# Patient Record
Sex: FEMALE | Race: WHITE | NOT HISPANIC OR LATINO | Employment: OTHER | URBAN - METROPOLITAN AREA
[De-identification: names, ages, dates, MRNs, and addresses within clinical notes are randomized per-mention and may not be internally consistent; named-entity substitution may affect disease eponyms.]

---

## 2017-01-12 ENCOUNTER — HOSPITAL ENCOUNTER (OUTPATIENT)
Dept: MAMMOGRAPHY | Facility: HOSPITAL | Age: 62
Discharge: HOME | End: 2017-01-12
Attending: FAMILY MEDICINE

## 2017-01-12 DIAGNOSIS — Z12.31 VISIT FOR SCREENING MAMMOGRAM: ICD-10-CM

## 2017-08-14 ENCOUNTER — RECORDS - HEALTHEAST (OUTPATIENT)
Dept: ADMINISTRATIVE | Facility: OTHER | Age: 62
End: 2017-08-14

## 2017-08-23 ENCOUNTER — HOSPITAL ENCOUNTER (OUTPATIENT)
Dept: RESPIRATORY THERAPY | Facility: CLINIC | Age: 62
Discharge: HOME | End: 2017-08-23

## 2017-08-23 DIAGNOSIS — J44.9 CHRONIC OBSTRUCTIVE LUNG DISEASE (H): ICD-10-CM

## 2017-11-10 ENCOUNTER — RECORDS - HEALTHEAST (OUTPATIENT)
Dept: LAB | Facility: CLINIC | Age: 62
End: 2017-11-10

## 2017-11-10 LAB
CHOLEST SERPL-MCNC: 107 MG/DL
FASTING STATUS PATIENT QL REPORTED: NO
HDLC SERPL-MCNC: 36 MG/DL
LDLC SERPL CALC-MCNC: 17 MG/DL
TRIGL SERPL-MCNC: 268 MG/DL

## 2018-02-01 ENCOUNTER — HOSPITAL ENCOUNTER (OUTPATIENT)
Dept: MAMMOGRAPHY | Facility: HOSPITAL | Age: 63
Discharge: HOME | End: 2018-02-01
Attending: FAMILY MEDICINE

## 2018-02-01 DIAGNOSIS — Z12.31 VISIT FOR SCREENING MAMMOGRAM: ICD-10-CM

## 2018-02-05 ENCOUNTER — RECORDS - HEALTHEAST (OUTPATIENT)
Dept: ADMINISTRATIVE | Facility: OTHER | Age: 63
End: 2018-02-05

## 2018-02-08 ENCOUNTER — HOSPITAL ENCOUNTER (OUTPATIENT)
Dept: MAMMOGRAPHY | Facility: HOSPITAL | Age: 63
Discharge: HOME | End: 2018-02-08
Attending: FAMILY MEDICINE

## 2018-02-08 DIAGNOSIS — N64.89 BREAST ASYMMETRY: ICD-10-CM

## 2018-08-20 ENCOUNTER — RECORDS - HEALTHEAST (OUTPATIENT)
Dept: LAB | Facility: CLINIC | Age: 63
End: 2018-08-20

## 2018-08-21 LAB
ALBUMIN SERPL-MCNC: 3.7 G/DL (ref 3.5–5)
ALP SERPL-CCNC: 75 U/L (ref 45–120)
ALT SERPL W P-5'-P-CCNC: 16 U/L (ref 0–45)
ANION GAP SERPL CALCULATED.3IONS-SCNC: 7 MMOL/L (ref 5–18)
AST SERPL W P-5'-P-CCNC: 17 U/L (ref 0–40)
BILIRUB SERPL-MCNC: 1 MG/DL (ref 0–1)
BUN SERPL-MCNC: 12 MG/DL (ref 8–22)
CALCIUM SERPL-MCNC: 9.8 MG/DL (ref 8.5–10.5)
CHLORIDE BLD-SCNC: 106 MMOL/L (ref 98–107)
CO2 SERPL-SCNC: 29 MMOL/L (ref 22–31)
CREAT SERPL-MCNC: 0.67 MG/DL (ref 0.6–1.1)
GFR SERPL CREATININE-BSD FRML MDRD: >60 ML/MIN/1.73M2
GLUCOSE BLD-MCNC: 85 MG/DL (ref 70–125)
POTASSIUM BLD-SCNC: 4 MMOL/L (ref 3.5–5)
PROT SERPL-MCNC: 6.9 G/DL (ref 6–8)
SODIUM SERPL-SCNC: 142 MMOL/L (ref 136–145)

## 2018-08-22 LAB
BKR LAB AP ABNORMAL BLEEDING: NO
BKR LAB AP BIRTH CONTROL/HORMONES: NORMAL
BKR LAB AP CERVICAL APPEARANCE: NORMAL
BKR LAB AP GYN ADEQUACY: NORMAL
BKR LAB AP GYN INTERPRETATION: NORMAL
BKR LAB AP HPV REFLEX: NORMAL
BKR LAB AP LMP: 2001
BKR LAB AP PATIENT STATUS: NO
BKR LAB AP PREVIOUS ABNORMAL: NORMAL
BKR LAB AP PREVIOUS NORMAL: 2012
HBA1C MFR BLD: 6.6 % (ref 4.2–6.1)
HIGH RISK?: NO
PATH REPORT.COMMENTS IMP SPEC: NORMAL
RESULT FLAG (HE HISTORICAL CONVERSION): NORMAL

## 2019-03-21 ENCOUNTER — HOSPITAL ENCOUNTER (OUTPATIENT)
Dept: MAMMOGRAPHY | Facility: CLINIC | Age: 64
Discharge: HOME | End: 2019-03-21
Attending: PHYSICIAN ASSISTANT

## 2019-03-21 ENCOUNTER — COMMUNICATION - HEALTHEAST (OUTPATIENT)
Dept: TELEHEALTH | Facility: CLINIC | Age: 64
End: 2019-03-21

## 2019-03-21 DIAGNOSIS — Z12.31 VISIT FOR SCREENING MAMMOGRAM: ICD-10-CM

## 2019-09-23 ENCOUNTER — RECORDS - HEALTHEAST (OUTPATIENT)
Dept: LAB | Facility: CLINIC | Age: 64
End: 2019-09-23

## 2019-09-23 LAB
ALBUMIN SERPL-MCNC: 3.8 G/DL (ref 3.5–5)
ALP SERPL-CCNC: 78 U/L (ref 45–120)
ALT SERPL W P-5'-P-CCNC: 19 U/L (ref 0–45)
ANION GAP SERPL CALCULATED.3IONS-SCNC: 13 MMOL/L (ref 5–18)
AST SERPL W P-5'-P-CCNC: 20 U/L (ref 0–40)
BILIRUB SERPL-MCNC: 1.1 MG/DL (ref 0–1)
BUN SERPL-MCNC: 11 MG/DL (ref 8–22)
CALCIUM SERPL-MCNC: 9.6 MG/DL (ref 8.5–10.5)
CHLORIDE BLD-SCNC: 103 MMOL/L (ref 98–107)
CHOLEST SERPL-MCNC: 121 MG/DL
CO2 SERPL-SCNC: 23 MMOL/L (ref 22–31)
CREAT SERPL-MCNC: 0.76 MG/DL (ref 0.6–1.1)
FASTING STATUS PATIENT QL REPORTED: NO
GFR SERPL CREATININE-BSD FRML MDRD: >60 ML/MIN/1.73M2
GLUCOSE BLD-MCNC: 158 MG/DL (ref 70–125)
HDLC SERPL-MCNC: 42 MG/DL
LDLC SERPL CALC-MCNC: 45 MG/DL
POTASSIUM BLD-SCNC: 3.8 MMOL/L (ref 3.5–5)
PROT SERPL-MCNC: 7.1 G/DL (ref 6–8)
SODIUM SERPL-SCNC: 139 MMOL/L (ref 136–145)
TRIGL SERPL-MCNC: 171 MG/DL

## 2019-09-24 LAB
25(OH)D3 SERPL-MCNC: 41 NG/ML (ref 30–80)
CANCER AG27-29 SERPL-ACNC: 8 U/ML (ref 0–39)

## 2020-09-28 ENCOUNTER — RECORDS - HEALTHEAST (OUTPATIENT)
Dept: LAB | Facility: CLINIC | Age: 65
End: 2020-09-28

## 2020-09-28 LAB
ALBUMIN SERPL-MCNC: 3.9 G/DL (ref 3.5–5)
ALP SERPL-CCNC: 89 U/L (ref 45–120)
ALT SERPL W P-5'-P-CCNC: 17 U/L (ref 0–45)
ANION GAP SERPL CALCULATED.3IONS-SCNC: 12 MMOL/L (ref 5–18)
AST SERPL W P-5'-P-CCNC: 20 U/L (ref 0–40)
BILIRUB SERPL-MCNC: 1 MG/DL (ref 0–1)
BUN SERPL-MCNC: 8 MG/DL (ref 8–22)
CALCIUM SERPL-MCNC: 9.7 MG/DL (ref 8.5–10.5)
CHLORIDE BLD-SCNC: 102 MMOL/L (ref 98–107)
CHOLEST SERPL-MCNC: 120 MG/DL
CO2 SERPL-SCNC: 27 MMOL/L (ref 22–31)
CREAT SERPL-MCNC: 0.68 MG/DL (ref 0.6–1.1)
CREAT UR-MCNC: 7.3 MG/DL
FASTING STATUS PATIENT QL REPORTED: ABNORMAL
GFR SERPL CREATININE-BSD FRML MDRD: >60 ML/MIN/1.73M2
GLUCOSE BLD-MCNC: 135 MG/DL (ref 70–125)
HDLC SERPL-MCNC: 41 MG/DL
LDLC SERPL CALC-MCNC: 45 MG/DL
MICROALBUMIN UR-MCNC: 0.67 MG/DL (ref 0–1.99)
MICROALBUMIN/CREAT UR: 91.8 MG/G
POTASSIUM BLD-SCNC: 3.6 MMOL/L (ref 3.5–5)
PROT SERPL-MCNC: 7.1 G/DL (ref 6–8)
SODIUM SERPL-SCNC: 141 MMOL/L (ref 136–145)
TRIGL SERPL-MCNC: 172 MG/DL

## 2020-10-28 ENCOUNTER — HOSPITAL ENCOUNTER (OUTPATIENT)
Dept: MAMMOGRAPHY | Facility: CLINIC | Age: 65
Discharge: HOME | End: 2020-10-28
Attending: PHYSICIAN ASSISTANT

## 2020-10-28 DIAGNOSIS — Z12.31 VISIT FOR SCREENING MAMMOGRAM: ICD-10-CM

## 2021-05-25 ENCOUNTER — RECORDS - HEALTHEAST (OUTPATIENT)
Dept: ADMINISTRATIVE | Facility: CLINIC | Age: 66
End: 2021-05-25

## 2021-05-26 ENCOUNTER — RECORDS - HEALTHEAST (OUTPATIENT)
Dept: ADMINISTRATIVE | Facility: CLINIC | Age: 66
End: 2021-05-26

## 2021-05-27 ENCOUNTER — RECORDS - HEALTHEAST (OUTPATIENT)
Dept: ADMINISTRATIVE | Facility: CLINIC | Age: 66
End: 2021-05-27

## 2021-05-28 ENCOUNTER — RECORDS - HEALTHEAST (OUTPATIENT)
Dept: ADMINISTRATIVE | Facility: CLINIC | Age: 66
End: 2021-05-28

## 2021-05-30 ENCOUNTER — RECORDS - HEALTHEAST (OUTPATIENT)
Dept: ADMINISTRATIVE | Facility: CLINIC | Age: 66
End: 2021-05-30

## 2021-05-31 ENCOUNTER — RECORDS - HEALTHEAST (OUTPATIENT)
Dept: ADMINISTRATIVE | Facility: CLINIC | Age: 66
End: 2021-05-31

## 2021-06-05 ENCOUNTER — RECORDS - HEALTHEAST (OUTPATIENT)
Dept: MAMMOGRAPHY | Facility: HOSPITAL | Age: 66
End: 2021-06-05

## 2021-06-05 DIAGNOSIS — N63.0 LUMP OR MASS IN BREAST: ICD-10-CM

## 2021-06-12 NOTE — PROGRESS NOTES
RESPIRATORY CARE NOTE     Patient Name: Janna Quispe  Today's Date: 8/23/2017     Complete PFT done. Pt performed tests with good effort. Test results meet ATS criteria. Albuterol neb given. Results scanned into epic. Pt left in no distress.          Problem List  Patient Active Problem List   Diagnosis     Osteopenia                           Emiliana Benton LRT

## 2021-07-13 ENCOUNTER — RECORDS - HEALTHEAST (OUTPATIENT)
Dept: ADMINISTRATIVE | Facility: CLINIC | Age: 66
End: 2021-07-13

## 2021-07-21 ENCOUNTER — RECORDS - HEALTHEAST (OUTPATIENT)
Dept: ADMINISTRATIVE | Facility: CLINIC | Age: 66
End: 2021-07-21

## 2021-09-12 ENCOUNTER — HOSPITAL ENCOUNTER (INPATIENT)
Facility: HOSPITAL | Age: 66
LOS: 2 days | Discharge: HOME | DRG: 287 | End: 2021-09-14
Attending: INTERNAL MEDICINE | Admitting: INTERNAL MEDICINE
Payer: COMMERCIAL

## 2021-09-12 ENCOUNTER — APPOINTMENT (OUTPATIENT)
Dept: CARDIOLOGY | Facility: HOSPITAL | Age: 66
DRG: 287 | End: 2021-09-12
Attending: INTERNAL MEDICINE
Payer: COMMERCIAL

## 2021-09-12 ENCOUNTER — APPOINTMENT (OUTPATIENT)
Dept: RADIOLOGY | Facility: HOSPITAL | Age: 66
DRG: 287 | End: 2021-09-12
Attending: EMERGENCY MEDICINE
Payer: COMMERCIAL

## 2021-09-12 DIAGNOSIS — I48.0 PAROXYSMAL ATRIAL FIBRILLATION (H): ICD-10-CM

## 2021-09-12 DIAGNOSIS — R07.9 CHEST PAIN, UNSPECIFIED TYPE: ICD-10-CM

## 2021-09-12 DIAGNOSIS — J44.1 COPD EXACERBATION (H): ICD-10-CM

## 2021-09-12 DIAGNOSIS — I50.43 ACUTE ON CHRONIC COMBINED SYSTOLIC AND DIASTOLIC HEART FAILURE (H): ICD-10-CM

## 2021-09-12 DIAGNOSIS — R00.2 PALPITATIONS: ICD-10-CM

## 2021-09-12 DIAGNOSIS — Z72.0 TOBACCO USER: Primary | ICD-10-CM

## 2021-09-12 DIAGNOSIS — I47.20 VENTRICULAR TACHYCARDIA (H): ICD-10-CM

## 2021-09-12 DIAGNOSIS — R06.02 SOB (SHORTNESS OF BREATH): ICD-10-CM

## 2021-09-12 PROBLEM — I44.7 LEFT BUNDLE BRANCH BLOCK: Status: ACTIVE | Noted: 2017-09-28

## 2021-09-12 PROBLEM — I25.10 ATHEROSCLEROSIS OF CORONARY ARTERY WITHOUT ANGINA PECTORIS: Status: ACTIVE | Noted: 2017-09-13

## 2021-09-12 PROBLEM — I45.4 IVCD (INTRAVENTRICULAR CONDUCTION DEFECT): Status: ACTIVE | Noted: 2017-08-15

## 2021-09-12 PROBLEM — R06.09 DOE (DYSPNEA ON EXERTION): Status: ACTIVE | Noted: 2017-09-13

## 2021-09-12 PROBLEM — E78.00 HIGH CHOLESTEROL: Status: ACTIVE | Noted: 2021-09-12

## 2021-09-12 PROBLEM — I34.0 MITRAL VALVE INSUFFICIENCY, UNSPECIFIED ETIOLOGY: Status: ACTIVE | Noted: 2021-09-12

## 2021-09-12 PROBLEM — J96.01 ACUTE HYPOXEMIC RESPIRATORY FAILURE (H): Status: ACTIVE | Noted: 2017-09-28

## 2021-09-12 PROBLEM — R60.9 DEPENDENT EDEMA: Status: ACTIVE | Noted: 2017-08-15

## 2021-09-12 PROBLEM — Z85.3 PERSONAL HISTORY OF MALIGNANT NEOPLASM OF BREAST: Status: ACTIVE | Noted: 2017-09-28

## 2021-09-12 PROBLEM — I42.9 CARDIOMYOPATHY (H): Status: ACTIVE | Noted: 2021-09-12

## 2021-09-12 PROBLEM — E11.9 TYPE 2 DIABETES MELLITUS WITHOUT COMPLICATION (H): Status: ACTIVE | Noted: 2017-09-29

## 2021-09-12 PROBLEM — I10 ESSENTIAL HYPERTENSION: Status: ACTIVE | Noted: 2021-09-12

## 2021-09-12 PROBLEM — Z95.0 BIVENTRICULAR CARDIAC PACEMAKER IN SITU: Status: ACTIVE | Noted: 2019-05-15

## 2021-09-12 LAB
ANION GAP SERPL CALCULATED.3IONS-SCNC: 13 MMOL/L (ref 5–18)
BNP SERPL-MCNC: 467 PG/ML (ref 0–112)
BUN SERPL-MCNC: 17 MG/DL (ref 8–22)
CALCIUM SERPL-MCNC: 9.5 MG/DL (ref 8.5–10.5)
CHLORIDE BLD-SCNC: 103 MMOL/L (ref 98–107)
CO2 SERPL-SCNC: 23 MMOL/L (ref 22–31)
CREAT SERPL-MCNC: 1.12 MG/DL (ref 0.6–1.1)
ERYTHROCYTE [DISTWIDTH] IN BLOOD BY AUTOMATED COUNT: 13.3 % (ref 10–15)
GFR SERPL CREATININE-BSD FRML MDRD: 51 ML/MIN/1.73M2
GLUCOSE BLD-MCNC: 239 MG/DL (ref 70–125)
HBA1C MFR BLD: 6.3 %
HCT VFR BLD AUTO: 45.6 % (ref 35–47)
HGB BLD-MCNC: 15.3 G/DL (ref 11.7–15.7)
HOLD SPECIMEN: NORMAL
HOLD SPECIMEN: NORMAL
LVEF ECHO: NORMAL
MAGNESIUM SERPL-MCNC: 1.8 MG/DL (ref 1.8–2.6)
MCH RBC QN AUTO: 31.4 PG (ref 26.5–33)
MCHC RBC AUTO-ENTMCNC: 33.6 G/DL (ref 31.5–36.5)
MCV RBC AUTO: 93 FL (ref 78–100)
PLATELET # BLD AUTO: 250 10E3/UL (ref 150–450)
POTASSIUM BLD-SCNC: 3.9 MMOL/L (ref 3.5–5)
RBC # BLD AUTO: 4.88 10E6/UL (ref 3.8–5.2)
SARS-COV-2 RNA RESP QL NAA+PROBE: NEGATIVE
SODIUM SERPL-SCNC: 139 MMOL/L (ref 136–145)
TROPONIN I SERPL-MCNC: 0.06 NG/ML (ref 0–0.29)
TROPONIN I SERPL-MCNC: 0.07 NG/ML (ref 0–0.29)
UFH PPP CHRO-ACNC: 0.32 IU/ML
UFH PPP CHRO-ACNC: >1.1 IU/ML
WBC # BLD AUTO: 12.4 10E3/UL (ref 4–11)

## 2021-09-12 PROCEDURE — 85520 HEPARIN ASSAY: CPT | Performed by: STUDENT IN AN ORGANIZED HEALTH CARE EDUCATION/TRAINING PROGRAM

## 2021-09-12 PROCEDURE — 96366 THER/PROPH/DIAG IV INF ADDON: CPT

## 2021-09-12 PROCEDURE — 96376 TX/PRO/DX INJ SAME DRUG ADON: CPT

## 2021-09-12 PROCEDURE — 96368 THER/DIAG CONCURRENT INF: CPT

## 2021-09-12 PROCEDURE — 99285 EMERGENCY DEPT VISIT HI MDM: CPT | Mod: 25

## 2021-09-12 PROCEDURE — 93005 ELECTROCARDIOGRAM TRACING: CPT | Mod: 76

## 2021-09-12 PROCEDURE — C9803 HOPD COVID-19 SPEC COLLECT: HCPCS

## 2021-09-12 PROCEDURE — 96365 THER/PROPH/DIAG IV INF INIT: CPT

## 2021-09-12 PROCEDURE — 258N000003 HC RX IP 258 OP 636: Performed by: INTERNAL MEDICINE

## 2021-09-12 PROCEDURE — 85520 HEPARIN ASSAY: CPT | Performed by: INTERNAL MEDICINE

## 2021-09-12 PROCEDURE — 250N000011 HC RX IP 250 OP 636: Performed by: INTERNAL MEDICINE

## 2021-09-12 PROCEDURE — 80048 BASIC METABOLIC PNL TOTAL CA: CPT | Performed by: EMERGENCY MEDICINE

## 2021-09-12 PROCEDURE — 93005 ELECTROCARDIOGRAM TRACING: CPT

## 2021-09-12 PROCEDURE — 99291 CRITICAL CARE FIRST HOUR: CPT | Mod: 25

## 2021-09-12 PROCEDURE — C8929 TTE W OR WO FOL WCON,DOPPLER: HCPCS

## 2021-09-12 PROCEDURE — 250N000013 HC RX MED GY IP 250 OP 250 PS 637: Performed by: EMERGENCY MEDICINE

## 2021-09-12 PROCEDURE — 210N000001 HC R&B IMCU HEART CARE

## 2021-09-12 PROCEDURE — 84484 ASSAY OF TROPONIN QUANT: CPT | Performed by: EMERGENCY MEDICINE

## 2021-09-12 PROCEDURE — 71045 X-RAY EXAM CHEST 1 VIEW: CPT

## 2021-09-12 PROCEDURE — 84484 ASSAY OF TROPONIN QUANT: CPT | Performed by: INTERNAL MEDICINE

## 2021-09-12 PROCEDURE — 258N000003 HC RX IP 258 OP 636: Performed by: EMERGENCY MEDICINE

## 2021-09-12 PROCEDURE — 99223 1ST HOSP IP/OBS HIGH 75: CPT | Mod: AI | Performed by: INTERNAL MEDICINE

## 2021-09-12 PROCEDURE — 99233 SBSQ HOSP IP/OBS HIGH 50: CPT | Performed by: INTERNAL MEDICINE

## 2021-09-12 PROCEDURE — 93306 TTE W/DOPPLER COMPLETE: CPT | Mod: 26 | Performed by: INTERNAL MEDICINE

## 2021-09-12 PROCEDURE — 36415 COLL VENOUS BLD VENIPUNCTURE: CPT | Performed by: INTERNAL MEDICINE

## 2021-09-12 PROCEDURE — 93005 ELECTROCARDIOGRAM TRACING: CPT | Performed by: INTERNAL MEDICINE

## 2021-09-12 PROCEDURE — 36415 COLL VENOUS BLD VENIPUNCTURE: CPT | Performed by: EMERGENCY MEDICINE

## 2021-09-12 PROCEDURE — 83880 ASSAY OF NATRIURETIC PEPTIDE: CPT | Performed by: EMERGENCY MEDICINE

## 2021-09-12 PROCEDURE — 250N000011 HC RX IP 250 OP 636: Performed by: EMERGENCY MEDICINE

## 2021-09-12 PROCEDURE — 36415 COLL VENOUS BLD VENIPUNCTURE: CPT | Performed by: STUDENT IN AN ORGANIZED HEALTH CARE EDUCATION/TRAINING PROGRAM

## 2021-09-12 PROCEDURE — 83735 ASSAY OF MAGNESIUM: CPT | Performed by: EMERGENCY MEDICINE

## 2021-09-12 PROCEDURE — 87635 SARS-COV-2 COVID-19 AMP PRB: CPT | Performed by: EMERGENCY MEDICINE

## 2021-09-12 PROCEDURE — 999N000208 ECHOCARDIOGRAM COMPLETE

## 2021-09-12 PROCEDURE — 85027 COMPLETE CBC AUTOMATED: CPT | Performed by: EMERGENCY MEDICINE

## 2021-09-12 PROCEDURE — 255N000002 HC RX 255 OP 636: Performed by: INTERNAL MEDICINE

## 2021-09-12 PROCEDURE — 83036 HEMOGLOBIN GLYCOSYLATED A1C: CPT | Performed by: INTERNAL MEDICINE

## 2021-09-12 PROCEDURE — 250N000013 HC RX MED GY IP 250 OP 250 PS 637: Performed by: INTERNAL MEDICINE

## 2021-09-12 PROCEDURE — 93005 ELECTROCARDIOGRAM TRACING: CPT | Performed by: EMERGENCY MEDICINE

## 2021-09-12 RX ORDER — ATORVASTATIN CALCIUM 10 MG/1
20 TABLET, FILM COATED ORAL AT BEDTIME
Status: DISCONTINUED | OUTPATIENT
Start: 2021-09-12 | End: 2021-09-13

## 2021-09-12 RX ORDER — LIDOCAINE 40 MG/G
CREAM TOPICAL
Status: DISCONTINUED | OUTPATIENT
Start: 2021-09-12 | End: 2021-09-14 | Stop reason: HOSPADM

## 2021-09-12 RX ORDER — CARVEDILOL 12.5 MG/1
25 TABLET ORAL 2 TIMES DAILY
COMMUNITY
Start: 2021-07-23

## 2021-09-12 RX ORDER — ONDANSETRON 2 MG/ML
4 INJECTION INTRAMUSCULAR; INTRAVENOUS EVERY 6 HOURS PRN
Status: DISCONTINUED | OUTPATIENT
Start: 2021-09-12 | End: 2021-09-14 | Stop reason: HOSPADM

## 2021-09-12 RX ORDER — BISACODYL 10 MG
10 SUPPOSITORY, RECTAL RECTAL DAILY PRN
Status: DISCONTINUED | OUTPATIENT
Start: 2021-09-12 | End: 2021-09-14 | Stop reason: HOSPADM

## 2021-09-12 RX ORDER — MULTIVITAMIN,THERAPEUTIC
1 TABLET ORAL DAILY
COMMUNITY
End: 2023-05-03

## 2021-09-12 RX ORDER — ASPIRIN 81 MG/1
81 TABLET, CHEWABLE ORAL DAILY
COMMUNITY
End: 2023-05-03

## 2021-09-12 RX ORDER — NICOTINE 21 MG/24HR
1 PATCH, TRANSDERMAL 24 HOURS TRANSDERMAL DAILY
Status: DISCONTINUED | OUTPATIENT
Start: 2021-09-12 | End: 2021-09-13

## 2021-09-12 RX ORDER — ATORVASTATIN CALCIUM 20 MG/1
20 TABLET, FILM COATED ORAL AT BEDTIME
COMMUNITY
Start: 2021-09-07

## 2021-09-12 RX ORDER — GUAIFENESIN/DEXTROMETHORPHAN 100-10MG/5
5 SYRUP ORAL EVERY 4 HOURS PRN
Status: DISCONTINUED | OUTPATIENT
Start: 2021-09-12 | End: 2021-09-14 | Stop reason: HOSPADM

## 2021-09-12 RX ORDER — CARVEDILOL 3.12 MG/1
6.25 TABLET ORAL 2 TIMES DAILY
Status: DISCONTINUED | OUTPATIENT
Start: 2021-09-12 | End: 2021-09-13

## 2021-09-12 RX ORDER — FUROSEMIDE 20 MG
20 TABLET ORAL EVERY MORNING
COMMUNITY
Start: 2021-06-29

## 2021-09-12 RX ORDER — CARVEDILOL 12.5 MG/1
12.5 TABLET ORAL ONCE
Status: DISCONTINUED | OUTPATIENT
Start: 2021-09-12 | End: 2021-09-12

## 2021-09-12 RX ORDER — CALCIUM CARBONATE 500 MG/1
1000 TABLET, CHEWABLE ORAL 4 TIMES DAILY PRN
Status: DISCONTINUED | OUTPATIENT
Start: 2021-09-12 | End: 2021-09-14 | Stop reason: HOSPADM

## 2021-09-12 RX ORDER — ASPIRIN 81 MG/1
81 TABLET, CHEWABLE ORAL DAILY
Status: DISCONTINUED | OUTPATIENT
Start: 2021-09-13 | End: 2021-09-13

## 2021-09-12 RX ORDER — POLYETHYLENE GLYCOL 3350 17 G/17G
17 POWDER, FOR SOLUTION ORAL DAILY PRN
Status: DISCONTINUED | OUTPATIENT
Start: 2021-09-12 | End: 2021-09-14 | Stop reason: HOSPADM

## 2021-09-12 RX ORDER — HEPARIN SODIUM 10000 [USP'U]/100ML
0-5000 INJECTION, SOLUTION INTRAVENOUS CONTINUOUS
Status: DISCONTINUED | OUTPATIENT
Start: 2021-09-12 | End: 2021-09-13

## 2021-09-12 RX ORDER — ONDANSETRON 4 MG/1
4 TABLET, ORALLY DISINTEGRATING ORAL EVERY 6 HOURS PRN
Status: DISCONTINUED | OUTPATIENT
Start: 2021-09-12 | End: 2021-09-14 | Stop reason: HOSPADM

## 2021-09-12 RX ORDER — SACUBITRIL AND VALSARTAN 49; 51 MG/1; MG/1
1 TABLET, FILM COATED ORAL 2 TIMES DAILY
COMMUNITY
Start: 2021-08-10

## 2021-09-12 RX ORDER — FUROSEMIDE 20 MG
20 TABLET ORAL EVERY MORNING
Status: DISCONTINUED | OUTPATIENT
Start: 2021-09-13 | End: 2021-09-14 | Stop reason: HOSPADM

## 2021-09-12 RX ORDER — NICOTINE 21 MG/24HR
1 PATCH, TRANSDERMAL 24 HOURS TRANSDERMAL DAILY
Status: DISCONTINUED | OUTPATIENT
Start: 2021-09-13 | End: 2021-09-14 | Stop reason: HOSPADM

## 2021-09-12 RX ADMIN — SODIUM CHLORIDE 500 ML: 9 INJECTION, SOLUTION INTRAVENOUS at 10:42

## 2021-09-12 RX ADMIN — AMIODARONE HYDROCHLORIDE 1 MG/MIN: 50 INJECTION, SOLUTION INTRAVENOUS at 10:48

## 2021-09-12 RX ADMIN — AMIODARONE HYDROCHLORIDE 150 MG: 1.5 INJECTION, SOLUTION INTRAVENOUS at 09:40

## 2021-09-12 RX ADMIN — SODIUM CHLORIDE 500 ML: 9 INJECTION, SOLUTION INTRAVENOUS at 11:58

## 2021-09-12 RX ADMIN — HEPARIN SODIUM 1000 UNITS/HR: 10000 INJECTION, SOLUTION INTRAVENOUS at 11:06

## 2021-09-12 RX ADMIN — NICOTINE 1 PATCH: 21 PATCH, EXTENDED RELEASE TRANSDERMAL at 11:12

## 2021-09-12 RX ADMIN — CARVEDILOL 6.25 MG: 3.12 TABLET, FILM COATED ORAL at 21:43

## 2021-09-12 RX ADMIN — PERFLUTREN 3 ML: 6.52 INJECTION, SUSPENSION INTRAVENOUS at 15:35

## 2021-09-12 ASSESSMENT — ACTIVITIES OF DAILY LIVING (ADL): DEPENDENT_IADLS:: INDEPENDENT

## 2021-09-12 NOTE — PHARMACY-ADMISSION MEDICATION HISTORY
Pharmacy Note - Admission Medication History    Pertinent Provider Information: N/A     ______________________________________________________________________    Prior To Admission (PTA) med list completed and updated in EMR.       PTA Med List   Medication Sig Last Dose     aspirin (ASA) 81 MG chewable tablet Take 81 mg by mouth daily 9/12/2021 at Unknown time     atorvastatin (LIPITOR) 20 MG tablet Take 20 mg by mouth At Bedtime 9/11/2021 at Unknown time     calcium carbonate-vitamin D (OYSTER SHELL CALCIUM/D) 500-200 MG-UNIT tablet Take 1 tablet by mouth daily 9/12/2021 at Unknown time     carvedilol (COREG) 12.5 MG tablet Take 1.5 tablets by mouth 2 times daily 9/12/2021 at Unknown time     ENTRESTO 49-51 MG per tablet Take 1 tablet by mouth 2 times daily 9/12/2021 at Unknown time     furosemide (LASIX) 20 MG tablet Take 20 mg by mouth every morning 9/12/2021 at Unknown time     multivitamin, therapeutic (THERA-VIT) TABS tablet Take 1 tablet by mouth daily 9/12/2021 at Unknown time       Information source(s): Patient  Method of interview communication: in-person    Summary of Changes to PTA Med List  New: entresto, furosemide, carvedilol, atorvastatin, ASA, multivitamin, calcium w/ D  Discontinued: combivent, prednisone  Changed: N/A    Patient was asked about OTC/herbal products specifically.  PTA med list reflects this.    In the past week, patient estimated taking medication this percent of the time:  greater than 90%.    Allergies were reviewed, assessed, and updated with the patient.      Patient does not use any multi-dose medications prior to admission.    The information provided in this note is only as accurate as the sources available at the time of the update(s).    Thank you for the opportunity to participate in the care of this patient.    Leeann Macedo  9/12/2021 10:08 AM

## 2021-09-12 NOTE — ED PROVIDER NOTES
EMERGENCY DEPARTMENT ENCOUNTER      NAME: Janna Quispe  AGE: 66 year old female  YOB: 1955  MRN: [id number]  EVALUATION DATE & TIME: 9/12/2021  9:14 AM    PCP: Gerardo Negrete    ED PROVIDER: Ana Longoria MD    Chief Complaint   Patient presents with     Chest Pain         FINAL IMPRESSION:  1. Ventricular tachycardia (H)    2. Chest pain, unspecified type    3. SOB (shortness of breath)    4. Palpitations          ED COURSE & MEDICAL DECISION MAKING:    Pertinent Labs & Imaging studies reviewed. (See chart for details)  66 year old female with history of nonischemic CHF with EF 30%, mild to moderate CAD, VT status post pacemaker/ICD, HTN, HLD, DM and history of breast cancer who presents to the Emergency Department for evaluation of left-sided chest pain since she awoke around 4 AM with palpitations, shortness of breath.  Patient tachycardic in the 180s.  Differential includes arrhythmia, dehydration, electrolyte abnormality, ACS.  My suspicion for PE, dissection is low.    Patient placed on monitor, IV established and blood obtained.  Patient has EKG showing a wide-complex regular rhythm without P waves.  This seems most consistent with slow VT but could be A. fib with PVC.  She will certainly have episodes that are much more tachycardic than on her EKG classic for VT in the 180s.  Patient given amiodarone bolus and placed on drip.  Has been hemodynamically stable.  Laboratory work-up is really unremarkable.  Her pacemaker/ICD was interrogated and shows upwards of 80 episodes of VT with the longest being greater than 2 minutes today. Patient in slow VT so not getting therapy.  Cardiology consulted and agree with the above plan.  We will order echo as it has been almost a year since her last and patient will be admitted for further management.         9:15 AM I met with the patient for the initial interview and physical examination. Discussed plan for treatment and workup in the ED.   9:40 AM  Increased amiodarone.   9:46 AM I talked with cardiology, Dr. Crista Cm. The patient had an device check, with 80 episodes of VT, with the longest being 120 seconds. All VT therapy is off.  9:47 AM Patient's heart rate is recorded at 110.    At the conclusion of the encounter I discussed the results of all of the tests and the disposition. The questions were answered. The patient or family acknowledged understanding and was agreeable with the care plan.    PPE: Provider wore gloves, N95 mask, eye protection, surgical cap, and paper mask.    CONSULTS:  Cardiology    CRITICAL CARE:  Critical Care  Performed by: Ana Longoria MD  Authorized by: Ana Longoria MD     Total critical care time: 48 minutes  Criticalcare time was exclusive of separately billable procedures and treating other patients.  Critical care was necessary to treat or prevent imminent or life-threatening deterioration of the following conditions: caridopulmonary decompensation, death, disability  Critical care was time spent personally by me on the following activities: development of treatment plan with patient or surrogate, discussions with consultants, examination of patient, evaluation of patient's response totreatment, obtaining history from patient or surrogate, ordering and performing treatments and interventions, ordering and review of laboratory studies, ordering and review of radiographic studies and re-evaluation ofpatient's condition, this excludes any separately billable procedures.      MEDICATIONS GIVEN IN THE EMERGENCY:  Medications   amiodarone (NEXTERONE) 900 mg in sodium chloride 0.9 % 500 mL infusion (1 mg/min Intravenous New Bag 9/12/21 1048)   amiodarone (NEXTERONE) 900 mg in sodium chloride 0.9 % 500 mL infusion (has no administration in time range)   heparin loading dose for LOW INTENSITY TREATMENT * Give BEFORE starting heparin infusion (has no administration in time range)   heparin 25,000 units in 0.45% NaCl 250 mL  "ANTICOAGULANT infusion (has no administration in time range)   nicotine Patch in Place (has no administration in time range)   nicotine (NICODERM CQ) 21 MG/24HR 24 hr patch 1 patch (has no administration in time range)   amiodarone (NEXTERONE) bolus 150 mg (0 mg Intravenous Stopped 9/12/21 1017)   0.9% sodium chloride BOLUS (500 mLs Intravenous New Bag 9/12/21 1042)       NEW PRESCRIPTIONS STARTED AT TODAY'S ER VISIT  New Prescriptions    No medications on file          =================================================================    HPI    Patient information was obtained from: Patient    Use of Intrepreter: N/A     Janna Quispe is a 66 year old female with pertinent medical history of chronic obstructive pulmonary disease with acute exaceration, acute chronic combined systolic and diastolic heart failure, athersclerosis of coronary artery without angina pectoris, biventricular cardiac pacemaker in situ, IVCD, cardiomyopathy, dependent edema, hypertension, high cholesterol, diabetes mellitus type 2, and breast cancer who presents to the ED via private car for evaluation of chest pain.     Per chart review (06/29/2021), the patient had a cardiology visit with Dr. Dany Crockett. The patient reports feeling well with no chest pain, SOB, dyspnea, or chest pressure. Her device check showed CRT pacing 97.7% of the time. She had 219 episodes of NVST, with longest run 21 seconds.    This morning (9/12), the patient began to experience chest pain around 0400. She endorses heart discomfort, numbing of hands, and shortness of breath. The patient had not fallen asleep for the night and was already awake during onset of symptoms. She expressed fear of a heart attack, although she has no history. Additionally, the patient feels \"sick to my stomach,\" and has had a racing heart for the past 3 hours. She is unsure if she has had atrial fibrillation or irregular heart rhythms in the past. The patient endorses a chronic cough " due to smoking. She regularly monitors her weight which is stable between a range of a couple lbs. The patient denies leg swelling, or any other complaints at this time.    Of note, the patient's has a [employer]tronic pacemaker.     REVIEW OF SYSTEMS  Constitutional:  Denies fever, chills, weight loss or weakness  Eyes:  No pain, discharge, redness  HENT:  Denies sore throat, ear pain, congestion  Respiratory: No wheeze. Positive for shortness of breath, cough (chronic).  Cardiovascular:  No palpitations. Positive for chest pain. Negative for leg swelling.  GI:  Denies abdominal pain, nausea, vomiting, diarrhea  : Denies dysuria, denies hematuria  Musculoskeletal:  Denies any new muscle/joint pain, swelling or loss of function.  Skin:  Denies rash, pallor  Neurologic:  Denies headache, focal weakness or sensory changes. Pos  Lymph: Denies swollen nodes    All other systems negative unless noted in HPI.      PAST MEDICAL HISTORY:  Past Medical History:   Diagnosis Date     Breast cancer (H) 2001     CAD (coronary artery disease)      Congestive heart failure (H)      COPD (chronic obstructive pulmonary disease) (H)      Diabetes (H)      HLD (hyperlipidemia)      Hx antineoplastic chemotherapy      Hx of radiation therapy      Hypertension      IVCD (intraventricular conduction defect)      LBBB (left bundle branch block)      VT (ventricular tachycardia) (H)        PAST SURGICAL HISTORY:  Past Surgical History:   Procedure Laterality Date     BIOPSY BREAST Left 2001     IMPLANT AUTOMATIC IMPLANTABLE CARDIOVERTER DEFIBRILLATOR       LUMPECTOMY BREAST Left 2001       CURRENT MEDICATIONS:    Prior to Admission Medications   Prescriptions Last Dose Informant Patient Reported? Taking?   ENTRESTO 49-51 MG per tablet 9/12/2021 at Unknown time  Yes Yes   Sig: Take 1 tablet by mouth 2 times daily   aspirin (ASA) 81 MG chewable tablet 9/12/2021 at Unknown time  Yes Yes   Sig: Take 81 mg by mouth daily   atorvastatin (LIPITOR) 20  MG tablet 9/11/2021 at Unknown time  Yes Yes   Sig: Take 20 mg by mouth At Bedtime   calcium carbonate-vitamin D (OYSTER SHELL CALCIUM/D) 500-200 MG-UNIT tablet 9/12/2021 at Unknown time  Yes Yes   Sig: Take 1 tablet by mouth daily   carvedilol (COREG) 12.5 MG tablet 9/12/2021 at Unknown time  Yes Yes   Sig: Take 1.5 tablets by mouth 2 times daily   furosemide (LASIX) 20 MG tablet 9/12/2021 at Unknown time  Yes Yes   Sig: Take 20 mg by mouth every morning   multivitamin, therapeutic (THERA-VIT) TABS tablet 9/12/2021 at Unknown time  Yes Yes   Sig: Take 1 tablet by mouth daily      Facility-Administered Medications: None       ALLERGIES:  Allergies   Allergen Reactions     Codeine Nausea and Vomiting     Penicillins Rash       FAMILY HISTORY:  Family History   Problem Relation Age of Onset     No Known Problems Mother      No Known Problems Father      No Known Problems Sister      No Known Problems Daughter      No Known Problems Maternal Grandmother      No Known Problems Maternal Grandfather      No Known Problems Paternal Grandmother      No Known Problems Paternal Grandfather      No Known Problems Maternal Aunt      No Known Problems Paternal Aunt      Hereditary Breast and Ovarian Cancer Syndrome No family hx of      Breast Cancer No family hx of      Cancer No family hx of      Colon Cancer No family hx of      Endometrial Cancer No family hx of      Ovarian Cancer No family hx of        SOCIAL HISTORY:  Social History     Tobacco Use     Smoking status: Current Every Day Smoker     Packs/day: 1.00     Years: 44.00     Pack years: 44.00     Types: Cigarettes     Smokeless tobacco: Never Used   Substance Use Topics     Alcohol use: None     Drug use: None        VITALS:  Patient Vitals for the past 24 hrs:   BP Temp Temp src Pulse Resp SpO2 Weight   09/12/21 1049 109/72 -- -- 117 -- 93 % --   09/12/21 1028 -- -- -- 117 -- 94 % --   09/12/21 1000 93/71 -- -- 113 -- 94 % --   09/12/21 0930 (!) 142/110 -- -- (!)  137 -- -- --   09/12/21 0912 106/62 98  F (36.7  C) Oral (!) 195 16 98 % 83.9 kg (185 lb)       PHYSICAL EXAM    General Appearance: Well-appearing, well-nourished, no acute distress. Mildly anxious.  Head:  Normocephalic,  Eyes:   conjunctiva/corneas clear  ENT: membranes are moist without pallor  Neck:  Supple  Chest:  No tenderness or deformity, no crepitus Pacemaker/IVCD in upper chest wall.  Cardio:  Regular rhythm, no murmur/gallop/rub, 2+ pulses symmetric in all extremities. Tachycardic with regular rhythm 140-180.   Pulm:   clear to auscultation bilaterally. Slightly tachypnic.  Back: No CVA tenderness, normal ROM  Abdomen:  Soft, non-tender, non distended,no rebound or guarding.  Extremities: Moves all extremities normally, normal gait  Skin:  Skin warm, dry, no rashes  Neuro:  Alert and oriented ×3, moving all extremities, no gross sensory defects     RADIOLOGY/LABS:  Reviewed all pertinent imaging. Please see official radiology report. All pertinent labs reviewed and interpreted.    Results for orders placed or performed during the hospital encounter of 09/12/21   Basic metabolic panel   Result Value Ref Range    Sodium 139 136 - 145 mmol/L    Potassium 3.9 3.5 - 5.0 mmol/L    Chloride 103 98 - 107 mmol/L    Carbon Dioxide (CO2) 23 22 - 31 mmol/L    Anion Gap 13 5 - 18 mmol/L    Urea Nitrogen 17 8 - 22 mg/dL    Creatinine 1.12 (H) 0.60 - 1.10 mg/dL    Calcium 9.5 8.5 - 10.5 mg/dL    Glucose 239 (H) 70 - 125 mg/dL    GFR Estimate 51 (L) >60 mL/min/1.73m2   Result Value Ref Range    Magnesium 1.8 1.8 - 2.6 mg/dL   CBC with platelets   Result Value Ref Range    WBC Count 12.4 (H) 4.0 - 11.0 10e3/uL    RBC Count 4.88 3.80 - 5.20 10e6/uL    Hemoglobin 15.3 11.7 - 15.7 g/dL    Hematocrit 45.6 35.0 - 47.0 %    MCV 93 78 - 100 fL    MCH 31.4 26.5 - 33.0 pg    MCHC 33.6 31.5 - 36.5 g/dL    RDW 13.3 10.0 - 15.0 %    Platelet Count 250 150 - 450 10e3/uL   Result Value Ref Range    Troponin I 0.06 0.00 - 0.29 ng/mL    Asymptomatic COVID-19 Virus (Coronavirus) by PCR Nasopharyngeal    Specimen: Nasopharyngeal; Swab   Result Value Ref Range    SARS CoV2 PCR Negative Negative   Extra Blue Top Tube   Result Value Ref Range    Hold Specimen JIC    Extra Red Top Tube   Result Value Ref Range    Hold Specimen JIC        EKG:  Performed at: 12-SEP-2021 09:20:28    Impression: Wide complex tachycardic rhythm without p-waves. Afibrillation LBBB and PVC vs. slow VT.     Rate: 139    QRS Interval: 126 ms  QTc Interval: 556 ms  Comparison: When compared with ECG of 10-JUL-2017, sinus tachycardic, rightward axis, nonspecific intra-ventricular  Conduction block, non-specific ST and T waves abnormality. Abnormal ECG.  I have independently reviewed and interpreted the EKG(s) documented above.    Second EKG shows paced rhythm rate 115.  There is however different morphologies to her paced rhythm.   .    The creation of this record is based on the scribe s observations of the work being performed by Ana Longoria MD and the provider s statements to them. It was created on his behalf by Leeann Gu, a trained medical scribe. This document has been checked and approved by the attending provider.    Ana Longoria MD  Emergency Medicine  [facility] EMERGENCY DEPARTMENT  [facility] [facility] [address] [phone]  Dept: [phone]       Ana Longoria MD  09/12/21 1052       Ana Longoria MD  09/12/21 1104       Ana Longoria MD  09/12/21 0382

## 2021-09-12 NOTE — CONSULTS
"  HEART CARE CONSULTATON NOTE        Assessment/Recommendations   Assessment:  1.  Atrial fibrillation: Appears to be atrial fibrillation with rapid ventricular response with PVCs on initial EKG  2.  Nonsustained ventricular tachycardia  3.  Status post CRT-D device  4.  Nonischemic cardiomyopathy with LVEF of 30%  5.  Tobacco abuse/COPD  6.  Hypertension  7.  Diabetes mellitus  8.  Nonobstructive coronary disease and angiogram in 2017      Plan:  1.  Start heparin drip for anticoagulation  2.  Trend troponins given initial complaints of chest pain  3.  Check BNP and chest x-ray  4.  Continue carvedilol, amiodarone  5.  Echocardiogram  6.  We will have [employer] representative come in for a full in person interrogation  Primary cardiologist at [facility]     History of Present Illness/Subjective    HPI: Janna Quispe is a 66 year old female with history of LBBB, nonischemic cardiomyopathy with an LVEF of 30%, nonobstructive coronary disease on angiogram in 2017, NSVT, active smoking, COPD presented to the ED with complaints of chest pain, shortness of breath, and palpitations.  When she presented she was initially tachycardic with heart rates in the 180s and was given amiodarone bolus.  Device interrogation shows AT/AF with brief episodes of NSVT.  She has received 2 therapies for VT with ATP no shocks.  EKG consistent with atrial fibrillation with rapid ventricular response and interventricular conduction delay with PVCs.  Currently feeling better without chest discomfort.  Initially was experiencing some sharp chest pain around her pacemaker site as well as shortness of breath with exertion and palpitations.  She feels mildly lightheaded.  She reports that symptoms began at 4 AM this morning after she had a \"girls weekend\" and was not following a good diet.      [facility]       Date: 10/20/2020     Referring Physician: ANGELA LIN     Indication: Cardiomyopathy "     CONCLUSIONS:   1.  Moderately enlarged left ventricular size with reduced systolic   performance with visually estimated ejection fraction of 30%.   2.  Global hypokinesis, more impressive in the septum likely from either   pacemaker activation and/or IVCD.   3.  Normal right ventricular size and systolic performance with pacemaker   wire present.   4.  Minimal senile aortic valve sclerosis without stenosis of a three   leaflet valve.   5.  Mild to moderate mitral regurgitation, likely secondary to LV   enlargement.   6.  Mild tricuspid valve regurgitation with estimated normal right heart   pressures.   7.  Mild left atrial enlargement.     COMMENTS: Compared to July 2019, the LV size is the same but the ejection   fraction has improved from previous as low as 25%.      Gerardo Arauz MD - 09/27/2017 11:23 AM CDT   Formatting of this note is different from the original.       [facility]   Cardiac Catheterization Report     Name:  JYOTSNA REYES Event Date: 9/27/2017 10:43   Excellian ID #: [id number]     Encounter #: [id number]   Accession #: [id number]   ROBERT #: [id number] Diagnostic Physician: GERARDO ARAUZ   [facility]   Primary Cardiologist: RAFFAELE CAMACHO   Referring Physician:   Primary Care Physician: RAFFAELE GOLD YOB: 1955   Gender: Female   Age: 62     Summary/Conclusions   DIAGNOSTIC - CORONARY   ? Co-dominant coronary artery system   ? Mild to moderate coronary disease. No high grade coronary lesions.   ? The left main artery was normal in appearance and free of obstructive disease.   ? The LAD has mild sequencial lesion throughout vessel and diagonal branches - but no focal severe lesions.   ? The LAD wraps well around the apex to supply some of the inferior-apical wall   ? The circumflex artery has mild seqeuncial lesions throughout vessel and OM's but no focal severe lesions.disease.   ? The RCA has moderate diffuse disease -  but no focal severe lesions.   The RCA only supplies a small to medium sized PDA   CASE NOTES   ? Comments: Pt found to have moderate reduction of LV systolic function (EF=30%).  A nuclear study had suggested inferior and anterior zones of mixed infarct and ischemia.   RECOMMENDATIONS & PLAN   ? Pt appears to has non-flow limitting ASCVD and some form of non-ischemic cardiomyopathy.   ? Will make sure pt is scheduled back to see Dr Escobar to allow  considerations re next steps in CV care/evaluationi.          Physical Examination  Review of Systems   VITALS: /72   Pulse 117   Temp 98  F (36.7  C) (Oral)   Resp 16   Wt 83.9 kg (185 lb)   SpO2 93%   BMI: There is no height or weight on file to calculate BMI.  Wt Readings from Last 3 Encounters:   09/12/21 83.9 kg (185 lb)       Intake/Output Summary (Last 24 hours) at 9/12/2021 1059  Last data filed at 9/12/2021 1017  Gross per 24 hour   Intake 100 ml   Output --   Net 100 ml     General Appearance:   no distress, normal body habitus   ENT/Mouth: membranes moist, no oral lesions or bleeding gums.      EYES:  no scleral icterus, normal conjunctivae   Neck: no carotid bruits or thyromegaly   Chest/Lungs:   lungs are clear to auscultation, no rales or wheezing   Cardiovascular:   irregular. Normal first and second heart sounds with no murmurs  no edema bilaterally    Abdomen:  no organomegaly, masses, bruits, or tenderness; bowel sounds are present   Extremities: no cyanosis or clubbing   Skin: no xanthelasma, warm.    Neurologic: normal  bilateral, no tremors     Psychiatric: alert and oriented x3, calm     Review Of Systems  Skin: negative  Eyes: negative  Ears/Nose/Throat: negative  Respiratory: Shortness of breath  Cardiovascular: tachycardia, irregular heart beat and chest pain  Gastrointestinal: negative  Genitourinary: negative  Musculoskeletal: negative  Neurologic: negative  Psychiatric: negative  Hematologic/Lymphatic/Immunologic:  negative  Endocrine: negative          Lab Results    Chemistry/lipid CBC Cardiac Enzymes/BNP/TSH/INR   Recent Labs   Lab Test 09/28/20  0756   CHOL 120   HDL 41*   LDL 45   TRIG 172*     Recent Labs   Lab Test 09/28/20  0756 09/23/19  0921 11/10/17  1600   LDL 45 45 17     Recent Labs   Lab Test 09/12/21  0932      POTASSIUM 3.9   CHLORIDE 103   CO2 23   *   BUN 17   CR 1.12*   GFRESTIMATED 51*   HECTOR 9.5     Recent Labs   Lab Test 09/12/21  0932 09/28/20  0756 09/23/19  0921   CR 1.12* 0.68 0.76     Recent Labs   Lab Test 08/20/18  1632   A1C 6.6*          Recent Labs   Lab Test 09/12/21  0932   WBC 12.4*   HGB 15.3   HCT 45.6   MCV 93        Recent Labs   Lab Test 09/12/21  0932   HGB 15.3    Recent Labs   Lab Test 09/12/21  0932   TROPONINI 0.06     No results for input(s): BNP, NTBNPI, NTBNP in the last 14431 hours.  No results for input(s): TSH in the last 93538 hours.  No results for input(s): INR in the last 34617 hours.     Medical History  Surgical History Family History Social History   Past Medical History:   Diagnosis Date     Breast cancer (H) 2001     CAD (coronary artery disease)      Congestive heart failure (H)      COPD (chronic obstructive pulmonary disease) (H)      Diabetes (H)      HLD (hyperlipidemia)      Hx antineoplastic chemotherapy      Hx of radiation therapy      Hypertension      IVCD (intraventricular conduction defect)      LBBB (left bundle branch block)      VT (ventricular tachycardia) (H)      Past Surgical History:   Procedure Laterality Date     BIOPSY BREAST Left 2001     IMPLANT AUTOMATIC IMPLANTABLE CARDIOVERTER DEFIBRILLATOR       LUMPECTOMY BREAST Left 2001     Family History   Problem Relation Age of Onset     No Known Problems Mother      No Known Problems Father      No Known Problems Sister      No Known Problems Daughter      No Known Problems Maternal Grandmother      No Known Problems Maternal Grandfather      No Known Problems Paternal  Grandmother      No Known Problems Paternal Grandfather      No Known Problems Maternal Aunt      No Known Problems Paternal Aunt      Hereditary Breast and Ovarian Cancer Syndrome No family hx of      Breast Cancer No family hx of      Cancer No family hx of      Colon Cancer No family hx of      Endometrial Cancer No family hx of      Ovarian Cancer No family hx of         Social History     Socioeconomic History     Marital status:      Spouse name: Not on file     Number of children: Not on file     Years of education: Not on file     Highest education level: Not on file   Occupational History     Not on file   Tobacco Use     Smoking status: Current Every Day Smoker     Packs/day: 1.00     Years: 44.00     Pack years: 44.00     Types: Cigarettes     Smokeless tobacco: Never Used   Substance and Sexual Activity     Alcohol use: Not on file     Drug use: Not on file     Sexual activity: Not on file   Other Topics Concern     Not on file   Social History Narrative     Not on file     Social Determinants of Health     Financial Resource Strain:      Difficulty of Paying Living Expenses:    Food Insecurity:      Worried About Running Out of Food in the Last Year:      Ran Out of Food in the Last Year:    Transportation Needs:      Lack of Transportation (Medical):      Lack of Transportation (Non-Medical):    Physical Activity:      Days of Exercise per Week:      Minutes of Exercise per Session:    Stress:      Feeling of Stress :    Social Connections:      Frequency of Communication with Friends and Family:      Frequency of Social Gatherings with Friends and Family:      Attends Caodaism Services:      Active Member of Clubs or Organizations:      Attends Club or Organization Meetings:      Marital Status:    Intimate Partner Violence:      Fear of Current or Ex-Partner:      Emotionally Abused:      Physically Abused:      Sexually Abused:          Medications  Allergies   Current Outpatient Medications    Medication Sig Dispense Refill     aspirin (ASA) 81 MG chewable tablet Take 81 mg by mouth daily       atorvastatin (LIPITOR) 20 MG tablet Take 20 mg by mouth At Bedtime       calcium carbonate-vitamin D (OYSTER SHELL CALCIUM/D) 500-200 MG-UNIT tablet Take 1 tablet by mouth daily       carvedilol (COREG) 12.5 MG tablet Take 1.5 tablets by mouth 2 times daily       ENTRESTO 49-51 MG per tablet Take 1 tablet by mouth 2 times daily       furosemide (LASIX) 20 MG tablet Take 20 mg by mouth every morning       multivitamin, therapeutic (THERA-VIT) TABS tablet Take 1 tablet by mouth daily          Allergies   Allergen Reactions     Codeine Nausea and Vomiting     Penicillins Rash         Bev Cm MD

## 2021-09-12 NOTE — H&P
Admission History and Physical   Janna SAMANTHA Quispe,  1955, MRN [id number]    [facility]  Ventricular tachycardia (H) [I47.2]    PCP: Gerardo Negrete, [address] [place]  [facility] [address], [phone]   Code status:  Full code        Extended Emergency Contact Information  Primary Emergency Contact: Aneesh Quispe  Address: [address] [place], [place] [id number] [place] [address]  Mobile Phone: [phone]  Relation: Spouse  Secondary Emergency Contact: BOUBACAR QUISPE  Mobile Phone: [phone]  Relation: Daughter       Assessment and Plan    66 year old old female with past medical history of mild to moderate nonobstructive CAD, nonischemic cardiomyopathy LVEF 30%, VT, LBBB, s/p CRT-D, tobacco use, hyperlipidemia, hypertension, prediabetes, ongoing smoking, breast cancer s/p mastectomy and Adriamycin ,DM 2 who presented for evaluation of chest pain and palpitations, found to have A. fib RVR and nonsustained VT    1.  Paroxysmal atrial fibrillation with RVR.  Converted to sinus rhythm.  Received IV amiodarone bolus followed by IV amiodarone infusion.  Started on IV heparin for anticoagulation  2.  Nonsustained ventricular tachycardia.  S/p CRT-D.  Continue lower dose carvedilol with holding parameters.    3.  Chest pain.  Monitor serial troponins  4.  Nonobstructive CAD.  Continue statin, aspirin, beta-blocker  5.  Nonischemic cardiomyopathy LVEF 30%.  Repeat echocardiogram.  On beta-blocker and Entresto PTA  6.  Breast ca, s/p mastectomy and Adriamycin   7.  Tobacco abuse.  Trying to quit.  Continue nicotine patch for withdrawal symptoms  8.  Acute kidney injury.  Likely due to hemodynamics.  Received 500 mL fluid bolus. Hold Entresto and Furosemide today.   Recheck renal function in a.m.  9.  History of treated diabetes, hyperglycemia.  Possibly stress related.  Check hemoglobin A1c      DVT Prophylaxis: IV heparin      Chief Complaint: Chest pain   "    HPI:    Janna Quispe is a 66 year old old female with past medical history of mild to moderate nonobstructive CAD, nonischemic cardiomyopathy LVEF 30%, VT, LBBB, s/p CRT-D, tobacco use, hyperlipidemia, hypertension, prediabetes, ongoing smoking, breast cancer s/p mastectomy and Adriamycin 2001,DM 2 who presented for evaluation of chest pain and palpitations  History is provided by patient and medical records  Patient reports that this morning at around 4 AM she started feeling palpitations, felt clammy, hot, dyspneic.  [place] \", where she had 1 drink but was trying to follow cardiac diet and took an extra dose of furosemide every night.  Denies any weight gain or lower extremity edema.  On initial evaluation in the ER patient was in wide-complex regular tachycardia with heart rates up to 180, Overall she has been hemodynamically stable with the single low BP 85/67.  She was given IV bolus of amiodarone and placed on amiodarone drip.  She was also given loading dose of heparin and started heparin drip.  Her pacemaker/ICD was interrogated and showed AT/A. fib with brief episodes of nonsustained VT.  She had received 2 therapies for VT with ATP but no shocks.    Patient was seen by her cardiologist Dr. Crockett on 6/29/2021 at [facility].  Device interrogation at that time demonstrated pacing 97.7% of the time with 219 episodes of nonsustained VT, longest 21 seconds.  Coreg dose was increased to 18.375 twice daily at that time.  Initial troponin is 0.06, potassium 3.9, magnesium 1.8, creatinine 1.12.       Medical History  Past Medical History:   Diagnosis Date     Breast cancer (H) 2001     CAD (coronary artery disease)      Congestive heart failure (H)      COPD (chronic obstructive pulmonary disease) (H)      Diabetes (H)      HLD (hyperlipidemia)      Hx antineoplastic chemotherapy      Hx of radiation therapy      Hypertension      IVCD (intraventricular conduction defect)      " LBBB (left bundle branch block)      VT (ventricular tachycardia) (H)         Surgical History  She  has a past surgical history that includes Lumpectomy breast (Left, 2001); Biopsy breast (Left, 2001); and Implant automatic implantable cardioverter defibrillator.       Social History  Reviewed, and she  reports that she has been smoking cigarettes, most recently about 10 cigarettes daily. She has a 44.00 pack-year smoking history. She has never used smokeless tobacco.  She drinks alcohol socially.  Social History     Tobacco Use     Smoking status: Current Every Day Smoker     Packs/day: 1.00     Years: 44.00     Pack years: 44.00     Types: Cigarettes     Smokeless tobacco: Never Used   Substance Use Topics     Alcohol use: Not on file          Allergies  Allergies   Allergen Reactions     Codeine Nausea and Vomiting     Penicillins Rash    Family History  Reviewed, and   Family History   Problem Relation Age of Onset     No Known Problems Mother      No Known Problems Father      No Known Problems Sister      No Known Problems Daughter      No Known Problems Maternal Grandmother      No Known Problems Maternal Grandfather      No Known Problems Paternal Grandmother      No Known Problems Paternal Grandfather      No Known Problems Maternal Aunt      No Known Problems Paternal Aunt      Hereditary Breast and Ovarian Cancer Syndrome No family hx of      Breast Cancer No family hx of      Cancer No family hx of      Colon Cancer No family hx of      Endometrial Cancer No family hx of      Ovarian Cancer No family hx of              Prior to Admission Medications   (Not in a hospital admission)         Review of Systems:  A 12 point comprehensive review of systems was negative except as noted. Physical Exam:  Temp:  [98  F (36.7  C)] 98  F (36.7  C)  Pulse:  [117-195] 117  Resp:  [16] 16  BP: (106-142)/() 142/110  SpO2:  [94 %-98 %] 94 %    BP (!) 142/110   Pulse 117   Temp 98  F (36.7  C) (Oral)   Resp 16    Wt 83.9 kg (185 lb)   SpO2 94%     General Appearance:   No acute distress   Head:    Normocephalic, without obvious abnormality, atraumatic   Eyes:    PERRL, conjunctiva/corneas clear, EOM's intact,both eyes    Ears:    Normal external ear canals no drainage or erythema bilat.   Nose:   Nares normal by gross inspection,  mucosa normal, no drainage or sinus tenderness   Throat:   Lips, mucosa, and tongue normal; teeth and gums normal   Neck:   Supple, symmetrical, trachea midline, no adenopathy;        thyroid:  No enlargement/tenderness/nodules   Back:     Symmetric, no curvature, ROM normal, no CVA tenderness   Lungs:    A few scattered rhonchi, no rales or wheezing   Chest wall:    No tenderness or deformity   Heart:    Regular rate and rhythm, S1 and S2 normal,no murmur, no rubs, no JVD, no edema   Abdomen:     Soft, non-tender, bowel sounds active all four quadrants,     no masses, no hepatosplenomegaly   Musculoskeletal:   Extremities are warm and non-tender, atraumatic, no joint swelling or tenderness   Pulses:   2+ and symmetric all extremities   Skin:   Skin color, texture, turgor normal, no rashes or lesions on exposed areas, please see nursing assessment for full skin assessment   Neurologic:  Awake and alert, grossly nonfocal        Pertinent Labs  Lab Results: personally reviewed.   Recent Labs   Lab 09/12/21  0932      CO2 23   BUN 17     Recent Labs   Lab 09/12/21  0932   WBC 12.4*   HGB 15.3   HCT 45.6        Recent Labs   Lab 09/12/21  0932   TROPONINI 0.06       MOST RECENT A1c, Iron, TIBC, Coags, TFTs  No results found for: HGBA1C, INR, PTT  No results found for: IRON  No results found for: TSH, T3FREE      Pertinent Radiology  No results found for this visit on 09/12/21.    EKG Results: Atrial fibrillation with RVR    Advanced Care Planning  Treatment and discharge Planning discussed with patient and her daughter  Anticipated Length of Stay in midnights (including a midnight in  the Emergency Department after triage if applicable): Monitoring 2 midnights for evaluation and treatment of A. fib with RVR, nonsustained VT      Juany White MD  Internal Medicine Hospitalist  9/12/2021

## 2021-09-12 NOTE — CONSULTS
Care Management Initial Consult    General Information  Assessment completed with: Patient, pt  Type of CM/SW Visit: CM Role Introduction    Primary Care Provider verified and updated as needed: Yes   Readmission within the last 30 days: no previous admission in last 30 days   Return Category: Progression of disease  Reason for Consult: discharge planning  Advance Care Planning: Advance Care Planning Reviewed: no concerns identified, verified with patient, questions answered  given HCD form       Communication Assessment  Patient's communication style: spoken language (English or Bilingual)             Cognitive  Cognitive/Neuro/Behavioral: WDL                      Living Environment:   People in home: spouse     Current living Arrangements: house      Able to return to prior arrangements: yes       Family/Social Support:  Care provided by: self  Provides care for: no one  Marital Status:             Description of Support System: Supportive    Support Assessment: Adequate family and caregiver support    Current Resources:   Patient receiving home care services: No     Community Resources: None  Equipment currently used at home: none  Supplies currently used at home: None    Employment/Financial:  Employment Status:          Financial Concerns: No concerns identified   Referral to Financial Counselor: No       Lifestyle & Psychosocial Needs:  Social Determinants of Health     Tobacco Use: High Risk     Smoking Tobacco Use: Current Every Day Smoker     Smokeless Tobacco Use: Never Used   Alcohol Use:      Frequency of Alcohol Consumption:      Average Number of Drinks:      Frequency of Binge Drinking:    Financial Resource Strain:      Difficulty of Paying Living Expenses:    Food Insecurity:      Worried About Running Out of Food in the Last Year:      Ran Out of Food in the Last Year:    Transportation Needs:      Lack of Transportation (Medical):      Lack of Transportation (Non-Medical):     Physical Activity:      Days of Exercise per Week:      Minutes of Exercise per Session:    Stress:      Feeling of Stress :    Social Connections:      Frequency of Communication with Friends and Family:      Frequency of Social Gatherings with Friends and Family:      Attends Jehovah's witness Services:      Active Member of Clubs or Organizations:      Attends Club or Organization Meetings:      Marital Status:    Intimate Partner Violence:      Fear of Current or Ex-Partner:      Emotionally Abused:      Physically Abused:      Sexually Abused:    Depression:      PHQ-2 Score:    Housing Stability:      Unable to Pay for Housing in the Last Year:      Number of Places Lived in the Last Year:      Unstable Housing in the Last Year:        Functional Status:  Prior to admission patient needed assistance:      Dependent IADLs:: Independent  Assesssment of Functional Status: At functional baseline    Mental Health Status:  Mental Health Status: No Current Concerns       Chemical Dependency Status:                Values/Beliefs:  Spiritual, Cultural Beliefs, Jehovah's witness Practices, Values that affect care:                 Additional Information:  Assessed, lives w/spouse and is independent w/no svcs, family to transport at discharge, provided HCD forms.      Anamaria Keys RN

## 2021-09-13 LAB
ANION GAP SERPL CALCULATED.3IONS-SCNC: 9 MMOL/L (ref 5–18)
BUN SERPL-MCNC: 11 MG/DL (ref 8–22)
CALCIUM SERPL-MCNC: 8.7 MG/DL (ref 8.5–10.5)
CHLORIDE BLD-SCNC: 107 MMOL/L (ref 98–107)
CO2 SERPL-SCNC: 24 MMOL/L (ref 22–31)
CREAT SERPL-MCNC: 0.71 MG/DL (ref 0.6–1.1)
ERYTHROCYTE [DISTWIDTH] IN BLOOD BY AUTOMATED COUNT: 13.6 % (ref 10–15)
GFR SERPL CREATININE-BSD FRML MDRD: 88 ML/MIN/1.73M2
GLUCOSE BLD-MCNC: 149 MG/DL (ref 70–125)
HCT VFR BLD AUTO: 38.9 % (ref 35–47)
HGB BLD-MCNC: 13.3 G/DL (ref 11.7–15.7)
MAGNESIUM SERPL-MCNC: 1.6 MG/DL (ref 1.8–2.6)
MAGNESIUM SERPL-MCNC: 2 MG/DL (ref 1.8–2.6)
MCH RBC QN AUTO: 31.8 PG (ref 26.5–33)
MCHC RBC AUTO-ENTMCNC: 34.2 G/DL (ref 31.5–36.5)
MCV RBC AUTO: 93 FL (ref 78–100)
PLATELET # BLD AUTO: 194 10E3/UL (ref 150–450)
POTASSIUM BLD-SCNC: 3.4 MMOL/L (ref 3.5–5)
POTASSIUM BLD-SCNC: 3.6 MMOL/L (ref 3.5–5)
RBC # BLD AUTO: 4.18 10E6/UL (ref 3.8–5.2)
SODIUM SERPL-SCNC: 140 MMOL/L (ref 136–145)
UFH PPP CHRO-ACNC: 0.19 IU/ML
UFH PPP CHRO-ACNC: 0.8 IU/ML
WBC # BLD AUTO: 7.8 10E3/UL (ref 4–11)

## 2021-09-13 PROCEDURE — 250N000013 HC RX MED GY IP 250 OP 250 PS 637: Performed by: INTERNAL MEDICINE

## 2021-09-13 PROCEDURE — 84132 ASSAY OF SERUM POTASSIUM: CPT | Performed by: INTERNAL MEDICINE

## 2021-09-13 PROCEDURE — 93005 ELECTROCARDIOGRAM TRACING: CPT | Performed by: INTERNAL MEDICINE

## 2021-09-13 PROCEDURE — 85027 COMPLETE CBC AUTOMATED: CPT | Performed by: INTERNAL MEDICINE

## 2021-09-13 PROCEDURE — B2111ZZ FLUOROSCOPY OF MULTIPLE CORONARY ARTERIES USING LOW OSMOLAR CONTRAST: ICD-10-PCS | Performed by: INTERNAL MEDICINE

## 2021-09-13 PROCEDURE — 36415 COLL VENOUS BLD VENIPUNCTURE: CPT | Performed by: EMERGENCY MEDICINE

## 2021-09-13 PROCEDURE — 85520 HEPARIN ASSAY: CPT | Performed by: INTERNAL MEDICINE

## 2021-09-13 PROCEDURE — 250N000011 HC RX IP 250 OP 636: Performed by: INTERNAL MEDICINE

## 2021-09-13 PROCEDURE — 4A023N7 MEASUREMENT OF CARDIAC SAMPLING AND PRESSURE, LEFT HEART, PERCUTANEOUS APPROACH: ICD-10-PCS | Performed by: INTERNAL MEDICINE

## 2021-09-13 PROCEDURE — 36415 COLL VENOUS BLD VENIPUNCTURE: CPT | Performed by: INTERNAL MEDICINE

## 2021-09-13 PROCEDURE — 93458 L HRT ARTERY/VENTRICLE ANGIO: CPT | Mod: 26 | Performed by: INTERNAL MEDICINE

## 2021-09-13 PROCEDURE — 93458 L HRT ARTERY/VENTRICLE ANGIO: CPT | Performed by: INTERNAL MEDICINE

## 2021-09-13 PROCEDURE — 255N000002 HC RX 255 OP 636: Performed by: INTERNAL MEDICINE

## 2021-09-13 PROCEDURE — 250N000012 HC RX MED GY IP 250 OP 636 PS 637: Performed by: INTERNAL MEDICINE

## 2021-09-13 PROCEDURE — 210N000001 HC R&B IMCU HEART CARE

## 2021-09-13 PROCEDURE — 250N000009 HC RX 250: Performed by: INTERNAL MEDICINE

## 2021-09-13 PROCEDURE — C1894 INTRO/SHEATH, NON-LASER: HCPCS | Performed by: INTERNAL MEDICINE

## 2021-09-13 PROCEDURE — 99232 SBSQ HOSP IP/OBS MODERATE 35: CPT | Performed by: INTERNAL MEDICINE

## 2021-09-13 PROCEDURE — 83735 ASSAY OF MAGNESIUM: CPT | Performed by: INTERNAL MEDICINE

## 2021-09-13 PROCEDURE — 250N000013 HC RX MED GY IP 250 OP 250 PS 637: Performed by: NURSE PRACTITIONER

## 2021-09-13 PROCEDURE — 99233 SBSQ HOSP IP/OBS HIGH 50: CPT | Performed by: INTERNAL MEDICINE

## 2021-09-13 PROCEDURE — 85520 HEPARIN ASSAY: CPT | Performed by: EMERGENCY MEDICINE

## 2021-09-13 PROCEDURE — 80048 BASIC METABOLIC PNL TOTAL CA: CPT | Performed by: INTERNAL MEDICINE

## 2021-09-13 PROCEDURE — 272N000001 HC OR GENERAL SUPPLY STERILE: Performed by: INTERNAL MEDICINE

## 2021-09-13 PROCEDURE — C1769 GUIDE WIRE: HCPCS | Performed by: INTERNAL MEDICINE

## 2021-09-13 RX ORDER — ATROPINE SULFATE 0.1 MG/ML
0.5 INJECTION INTRAVENOUS
Status: ACTIVE | OUTPATIENT
Start: 2021-09-13 | End: 2021-09-13

## 2021-09-13 RX ORDER — CARVEDILOL 12.5 MG/1
12.5 TABLET ORAL 2 TIMES DAILY
Status: DISCONTINUED | OUTPATIENT
Start: 2021-09-13 | End: 2021-09-14

## 2021-09-13 RX ORDER — NALOXONE HYDROCHLORIDE 0.4 MG/ML
0.2 INJECTION, SOLUTION INTRAMUSCULAR; INTRAVENOUS; SUBCUTANEOUS
Status: ACTIVE | OUTPATIENT
Start: 2021-09-13 | End: 2021-09-13

## 2021-09-13 RX ORDER — AMIODARONE HYDROCHLORIDE 200 MG/1
200 TABLET ORAL 2 TIMES DAILY
Status: DISCONTINUED | OUTPATIENT
Start: 2021-09-13 | End: 2021-09-14 | Stop reason: HOSPADM

## 2021-09-13 RX ORDER — HYDRALAZINE HYDROCHLORIDE 20 MG/ML
10 INJECTION INTRAMUSCULAR; INTRAVENOUS
Status: DISCONTINUED | OUTPATIENT
Start: 2021-09-13 | End: 2021-09-14 | Stop reason: HOSPADM

## 2021-09-13 RX ORDER — ATORVASTATIN CALCIUM 40 MG/1
40 TABLET, FILM COATED ORAL AT BEDTIME
Status: DISCONTINUED | OUTPATIENT
Start: 2021-09-13 | End: 2021-09-13

## 2021-09-13 RX ORDER — OXYCODONE HYDROCHLORIDE 5 MG/1
5 TABLET ORAL EVERY 4 HOURS PRN
Status: DISCONTINUED | OUTPATIENT
Start: 2021-09-13 | End: 2021-09-14 | Stop reason: HOSPADM

## 2021-09-13 RX ORDER — ASPIRIN 81 MG/1
243 TABLET, CHEWABLE ORAL ONCE
Status: COMPLETED | OUTPATIENT
Start: 2021-09-13 | End: 2021-09-13

## 2021-09-13 RX ORDER — POTASSIUM CHLORIDE 1500 MG/1
20 TABLET, EXTENDED RELEASE ORAL ONCE
Status: COMPLETED | OUTPATIENT
Start: 2021-09-13 | End: 2021-09-13

## 2021-09-13 RX ORDER — ACETAMINOPHEN 325 MG/1
650 TABLET ORAL EVERY 4 HOURS PRN
Status: DISCONTINUED | OUTPATIENT
Start: 2021-09-13 | End: 2021-09-14 | Stop reason: HOSPADM

## 2021-09-13 RX ORDER — FLUMAZENIL 0.1 MG/ML
0.2 INJECTION, SOLUTION INTRAVENOUS
Status: ACTIVE | OUTPATIENT
Start: 2021-09-13 | End: 2021-09-13

## 2021-09-13 RX ORDER — IODIXANOL 320 MG/ML
INJECTION, SOLUTION INTRAVASCULAR
Status: DISCONTINUED | OUTPATIENT
Start: 2021-09-13 | End: 2021-09-13 | Stop reason: HOSPADM

## 2021-09-13 RX ORDER — FENTANYL CITRATE 50 UG/ML
INJECTION, SOLUTION INTRAMUSCULAR; INTRAVENOUS
Status: DISCONTINUED | OUTPATIENT
Start: 2021-09-13 | End: 2021-09-13 | Stop reason: HOSPADM

## 2021-09-13 RX ORDER — POTASSIUM CHLORIDE 1500 MG/1
40 TABLET, EXTENDED RELEASE ORAL ONCE
Status: COMPLETED | OUTPATIENT
Start: 2021-09-13 | End: 2021-09-13

## 2021-09-13 RX ORDER — ATORVASTATIN CALCIUM 40 MG/1
40 TABLET, FILM COATED ORAL EVERY MORNING
Status: DISCONTINUED | OUTPATIENT
Start: 2021-09-14 | End: 2021-09-14 | Stop reason: HOSPADM

## 2021-09-13 RX ORDER — MAGNESIUM SULFATE HEPTAHYDRATE 40 MG/ML
2 INJECTION, SOLUTION INTRAVENOUS ONCE
Status: COMPLETED | OUTPATIENT
Start: 2021-09-13 | End: 2021-09-13

## 2021-09-13 RX ORDER — PREDNISONE 20 MG/1
40 TABLET ORAL DAILY
Status: DISCONTINUED | OUTPATIENT
Start: 2021-09-13 | End: 2021-09-14 | Stop reason: HOSPADM

## 2021-09-13 RX ORDER — NALOXONE HYDROCHLORIDE 0.4 MG/ML
0.4 INJECTION, SOLUTION INTRAMUSCULAR; INTRAVENOUS; SUBCUTANEOUS
Status: ACTIVE | OUTPATIENT
Start: 2021-09-13 | End: 2021-09-13

## 2021-09-13 RX ORDER — FENTANYL CITRATE 50 UG/ML
25 INJECTION, SOLUTION INTRAMUSCULAR; INTRAVENOUS
Status: DISCONTINUED | OUTPATIENT
Start: 2021-09-13 | End: 2021-09-14 | Stop reason: HOSPADM

## 2021-09-13 RX ORDER — ALBUTEROL SULFATE 5 MG/ML
2.5 SOLUTION RESPIRATORY (INHALATION) EVERY 6 HOURS PRN
Status: DISCONTINUED | OUTPATIENT
Start: 2021-09-13 | End: 2021-09-14 | Stop reason: HOSPADM

## 2021-09-13 RX ORDER — ASPIRIN 81 MG/1
81 TABLET ORAL DAILY
Status: DISCONTINUED | OUTPATIENT
Start: 2021-09-14 | End: 2021-09-14 | Stop reason: HOSPADM

## 2021-09-13 RX ORDER — DIAZEPAM 5 MG
5 TABLET ORAL ONCE
Status: COMPLETED | OUTPATIENT
Start: 2021-09-13 | End: 2021-09-13

## 2021-09-13 RX ORDER — SODIUM CHLORIDE 9 MG/ML
75 INJECTION, SOLUTION INTRAVENOUS CONTINUOUS
Status: ACTIVE | OUTPATIENT
Start: 2021-09-13 | End: 2021-09-13

## 2021-09-13 RX ORDER — OXYCODONE HYDROCHLORIDE 5 MG/1
10 TABLET ORAL EVERY 4 HOURS PRN
Status: DISCONTINUED | OUTPATIENT
Start: 2021-09-13 | End: 2021-09-14 | Stop reason: HOSPADM

## 2021-09-13 RX ADMIN — CARVEDILOL 12.5 MG: 12.5 TABLET, FILM COATED ORAL at 19:03

## 2021-09-13 RX ADMIN — NICOTINE 1 PATCH: 14 PATCH, EXTENDED RELEASE TRANSDERMAL at 09:17

## 2021-09-13 RX ADMIN — MAGNESIUM SULFATE HEPTAHYDRATE 2 G: 40 INJECTION, SOLUTION INTRAVENOUS at 10:19

## 2021-09-13 RX ADMIN — ASPIRIN 243 MG: 81 TABLET, CHEWABLE ORAL at 12:43

## 2021-09-13 RX ADMIN — POTASSIUM CHLORIDE 40 MEQ: 20 TABLET, EXTENDED RELEASE ORAL at 21:18

## 2021-09-13 RX ADMIN — GUAIFENESIN AND DEXTROMETHORPHAN 5 ML: 100; 10 SYRUP ORAL at 00:33

## 2021-09-13 RX ADMIN — ASPIRIN 81 MG: 81 TABLET, CHEWABLE ORAL at 09:15

## 2021-09-13 RX ADMIN — AMIODARONE HYDROCHLORIDE 200 MG: 200 TABLET ORAL at 19:03

## 2021-09-13 RX ADMIN — POTASSIUM CHLORIDE 20 MEQ: 20 TABLET, EXTENDED RELEASE ORAL at 09:16

## 2021-09-13 RX ADMIN — CARVEDILOL 6.25 MG: 3.12 TABLET, FILM COATED ORAL at 09:16

## 2021-09-13 RX ADMIN — GUAIFENESIN AND DEXTROMETHORPHAN 5 ML: 100; 10 SYRUP ORAL at 10:03

## 2021-09-13 RX ADMIN — AMIODARONE HYDROCHLORIDE 200 MG: 200 TABLET ORAL at 10:00

## 2021-09-13 RX ADMIN — FUROSEMIDE 20 MG: 20 TABLET ORAL at 09:16

## 2021-09-13 RX ADMIN — PREDNISONE 40 MG: 20 TABLET ORAL at 19:03

## 2021-09-13 RX ADMIN — HEPARIN SODIUM 1150 UNITS/HR: 10000 INJECTION, SOLUTION INTRAVENOUS at 08:13

## 2021-09-13 RX ADMIN — APIXABAN 5 MG: 5 TABLET, FILM COATED ORAL at 19:03

## 2021-09-13 RX ADMIN — DIAZEPAM 5 MG: 5 TABLET ORAL at 12:43

## 2021-09-13 ASSESSMENT — MIFFLIN-ST. JEOR
SCORE: 1375.49
SCORE: 1356.89

## 2021-09-13 NOTE — PLAN OF CARE
Transfer from ER for prep for angiogram.  No question.   present.  No chest pain.  Sinus.  Plan transfer to P3 post procedure.

## 2021-09-13 NOTE — PROGRESS NOTES
Hospitalist Progress Note    Assessment/Plan  66 year old old female with past medical history of mild to moderate nonobstructive CAD, nonischemic cardiomyopathy LVEF 30%, VT, LBBB, s/p CRT-D, tobacco use, hyperlipidemia, hypertension, prediabetes, ongoing smoking, breast cancer s/p mastectomy and Adriamycin 2001,DM 2 who presented for evaluation of chest pain and palpitations, found to have A. fib RVR and nonsustained VT. Echocardiogram with decline in LV systolic function and early LV thrombus could not be excluded.  S/p coronary angiography which showed mild to moderate disease, grossly unchanged since 2017.     1.  Paroxysmal atrial fibrillation with RVR.  Converted to sinus rhythm after initiation of amiodarone.  Continue p.o. amiodarone.  Start Eliquis for CVA prevention.   2.  Nonsustained ventricular tachycardia.  Continue  carvedilol.  Monitor and replace electrolytes  3.  Reactive airways with acute exacerbation, ? Undiagnosed COPD.  Start prednisone x5 days.  Albuterol nebs as needed.  Consider repeating PFTs (normal in 2017)  4.  Nonobstructive CAD.    Unchanged since 2017. Continue statin, aspirin, beta-blocker, Entresto  5.  Nonischemic cardiomyopathy with decline in LV systolic function 20-25%.  Does not appear hypervolemic on exam.  On beta-blocker,  Entresto PTA, furosemide   7.  Tobacco abuse.  Trying to quit.  Continue nicotine patch for withdrawal symptoms  8.  Acute kidney injury.  Likely due to hemodynamics.  Received 500 mL fluid bolus.  Resolved.    9.  Hyperglycemia. Likely stress related.   A1c 6.3      Barriers to Discharge:  Monitoring after coronary angiogram, abnormal electrolytes    Anticipated discharge date/Disposition: home tomorrow     Subjective  No chest pain or palpitations.  Reports new onset congested cough.      Objective    Vital signs in last 24 hours  Temp:  [98.1  F (36.7  C)-98.4  F (36.9  C)] 98.4  F (36.9  C)  Pulse:  [70-96] 77  Resp:  [16-20] 18  BP:  (110-139)/(60-93) 120/73  SpO2:  [90 %-95 %] 92 % @LASTSAO2(12)@ O2 Device: None (Room air)    Weight:   Wt Readings from Last 3 Encounters:   09/13/21 87.1 kg (192 lb 1.6 oz)      Weight change:     Intake/Output last 3 shifts  I/O last 3 completed shifts:  In: 524.21 [I.V.:474.21; IV Piggyback:50]  Out: -   Body mass index is 34.03 kg/m .    Physical Exam    General Appearance:    Alert, cooperative, no distress, appears stated age   Lungs:     Scattered rhonchi and wheezes   Cardiovascular:    Regular rate ands rhythm.  Nornal S1, S2.  No murmur, rub or gallop.  No edema   Abdomen:     Soft, non-tender, bowel sounds active all four quadrants,     no masses, no organomegaly   Neurologic:   Awake, alert, oriented x 3.  Grossly nonfocal      Pertinent Labs   Lab Results: personally reviewed.   Recent Labs   Lab 09/13/21  0520 09/12/21  0932    139   CO2 24 23   BUN 11 17     Recent Labs   Lab 09/13/21  0520 09/12/21  0932   WBC 7.8 12.4*   HGB 13.3 15.3   HCT 38.9 45.6    250     Recent Labs   Lab 09/12/21  1350 09/12/21  0932   TROPONINI 0.07 0.06     Invalid input(s): POCGLUFGR    Medications  Current Facility-Administered Medications   Medication     0.9% sodium chloride BOLUS     acetaminophen (TYLENOL) tablet 650 mg     albuterol (PROVENTIL) neb solution 2.5 mg     amiodarone (PACERONE) tablet 200 mg     apixaban ANTICOAGULANT (ELIQUIS) tablet 5 mg     [START ON 9/14/2021] aspirin EC tablet 81 mg     [START ON 9/14/2021] atorvastatin (LIPITOR) tablet 40 mg     atropine injection 0.5 mg     bisacodyl (DULCOLAX) Suppository 10 mg     calcium carbonate (TUMS) chewable tablet 1,000 mg     carvedilol (COREG) tablet 12.5 mg     fentaNYL (PF) (SUBLIMAZE) injection 25 mcg     flumazenil (ROMAZICON) injection 0.2 mg     furosemide (LASIX) tablet 20 mg     guaiFENesin-dextromethorphan (ROBITUSSIN DM) 100-10 MG/5ML syrup 5 mL     HOLD:  Metformin and metformin containing medications if patient received IV  contrast with acute kidney injury or severe chronic kidney disease (stage IV or stage V; i.e., eGFR less than 30)     hydrALAZINE (APRESOLINE) injection 10 mg     lidocaine (LMX4) cream     lidocaine 1 % 0.1-1 mL     magnesium hydroxide (MILK OF MAGNESIA) suspension 30 mL     melatonin tablet 1 mg     naloxone (NARCAN) injection 0.2 mg    Or     naloxone (NARCAN) injection 0.4 mg    Or     naloxone (NARCAN) injection 0.2 mg    Or     naloxone (NARCAN) injection 0.4 mg     nicotine (NICODERM CQ) 14 MG/24HR 24 hr patch 1 patch     nicotine Patch in Place     nicotine Patch in Place     ondansetron (ZOFRAN-ODT) ODT tab 4 mg    Or     ondansetron (ZOFRAN) injection 4 mg     oxyCODONE (ROXICODONE) tablet 5 mg    Or     oxyCODONE (ROXICODONE) tablet 10 mg     polyethylene glycol (MIRALAX) Packet 17 g     predniSONE (DELTASONE) tablet 40 mg     sacubitril-valsartan (ENTRESTO) 49-51 MG per tablet 1 tablet     sodium chloride (PF) 0.9% PF flush 3 mL     sodium chloride (PF) 0.9% PF flush 3 mL     sodium phosphate (FLEET ENEMA) 1 enema       Pertinent Radiology   Radiology Results: Personally reviewed   Results for orders placed or performed during the hospital encounter of 09/12/21   XR Chest Port 1 View    Impression    IMPRESSION: There is an AICD. The leads are incompletely visualized but appear in appropriate position on this single view. Heart size appears normal. There is pulmonary vascular congestion. The previously seen interstitial edema persists, but is   improved.    Right shoulder prosthesis.   Echocardiogram Complete   Result Value Ref Range    LVEF  20-25%          Advanced Care Planning:  Discharge Planning discussed with patient and RN        Juany White MD  Internal Medicine Hospitalist  9/13/2021

## 2021-09-13 NOTE — ED NOTES
Pt resting on cart.   PAST SURGICAL HISTORY:  H/O coronary artery bypass surgery x4 vessels    S/P appendectomy     S/P cholecystectomy     S/P drug eluting coronary stent placement     S/P eye surgery

## 2021-09-13 NOTE — PROGRESS NOTES
HEART CARE CONSULTATON NOTE        Assessment/Recommendations   Assessment:  1.  Atrial fibrillation: Went into atrial fibrillation with rapid ventricular response yesterday morning which was symptomatic.  Also had runs of NSVT requiring ATP.  Spoke with [employer] representative who interrogated pacemaker yesterday.  After amiodarone she converted to normal sinus rhythm with out much ventricular ectopy.  2.  Nonsustained ventricular tachycardia  3.  Status post CRT-D device  4.  Nonischemic cardiomyopathy with LVEF of 30%: Repeat echocardiogram now shows reduced left ventricular ejection fraction of 20-25% cannot exclude left ventricular thrombus and lateral wall appears to be hypokinetic which is new compared to prior echocardiogram.  Nonobstructive coronary artery disease on angiogram in 2017.  Patient is a heavy smoker and recommend proceeding with coronary angiography for further evaluation given new findings on echocardiogram and chest pain with rhythm disturbances on admission.  5.  Tobacco abuse/COPD  6.  Hypertension  7.  Diabetes mellitus  8.  Nonobstructive coronary disease and angiogram in 2017        Plan:  1.    Will continue heparin drip for now until she has her angiogram.  Post angiography would start Eliquis 5 mg twice daily for anticoagulation  2.    Increase carvedilol to 12.5 mg twice daily.  Continue on Entresto and Lasix  3.  Stop IV amiodarone and change to p.o.  4.    N.p.o. for coronary angiography today.  Explained risks and benefits of the procedure to her and her  at the bedside today and they are in agreement with the plan.    5.  Smoking cessation counseling.  Primary cardiologist at [facility]       History of Present Illness/Subjective    Dry cough.  Breathing has significantly improved.  No further chest pain since yesterday.       Physical Examination  Review of Systems   VITALS: /78   Pulse 80   Temp 98  F (36.7  C) (Oral)   Resp 16   Wt 83.9 kg (185 lb)    Pt to CT.   SpO2 92%   BMI: There is no height or weight on file to calculate BMI.  Wt Readings from Last 3 Encounters:   09/12/21 83.9 kg (185 lb)       Intake/Output Summary (Last 24 hours) at 9/13/2021 1051  Last data filed at 9/12/2021 1449  Gross per 24 hour   Intake 1000 ml   Output --   Net 1000 ml     General Appearance:   no distress, normal body habitus   ENT/Mouth: membranes moist, no oral lesions or bleeding gums.      EYES:  no scleral icterus, normal conjunctivae   Neck: no carotid bruits or thyromegaly   Chest/Lungs:   lungs are clear to auscultation   Cardiovascular:   Regular. Normal first and second heart sounds with no murmurs   Abdomen:  no organomegaly, masses, bruits, or tenderness; bowel sounds are present   Extremities: no cyanosis or clubbing   Skin: no xanthelasma, warm.    Neurologic: normal  bilateral, no tremors     Psychiatric: alert and oriented x3, calm     Review Of Systems  Skin: negative  Eyes: negative  Ears/Nose/Throat: negative  Respiratory: No shortness of breath, dyspnea on exertion, cough, or hemoptysis  Cardiovascular: negative    Gastrointestinal: negative  Genitourinary: negative  Musculoskeletal: negative  Neurologic: negative  Psychiatric: negative  Hematologic/Lymphatic/Immunologic: negative  Endocrine: negative          Lab Results    Chemistry/lipid CBC Cardiac Enzymes/BNP/TSH/INR   Recent Labs   Lab Test 09/28/20  0756   CHOL 120   HDL 41*   LDL 45   TRIG 172*     Recent Labs   Lab Test 09/28/20  0756 09/23/19  0921 11/10/17  1600   LDL 45 45 17     Recent Labs   Lab Test 09/13/21  0520      POTASSIUM 3.4*   CHLORIDE 107   CO2 24   *   BUN 11   CR 0.71   GFRESTIMATED 88   HECTOR 8.7     Recent Labs   Lab Test 09/13/21  0520 09/12/21  0932 09/28/20  0756   CR 0.71 1.12* 0.68     Recent Labs   Lab Test 09/12/21  2150 08/20/18  1632   A1C 6.3* 6.6*          Recent Labs   Lab Test 09/13/21  0520   WBC 7.8   HGB 13.3   HCT 38.9   MCV 93        Recent Labs   Lab  Test 09/13/21  0520 09/12/21  0932   HGB 13.3 15.3    Recent Labs   Lab Test 09/12/21  1350 09/12/21  0932   TROPONINI 0.07 0.06     Recent Labs   Lab Test 09/12/21  0932   *     No results for input(s): TSH in the last 34518 hours.  No results for input(s): INR in the last 56461 hours.     Medical History  Surgical History Family History Social History   Past Medical History:   Diagnosis Date     Breast cancer (H) 2001     CAD (coronary artery disease)      Congestive heart failure (H)      COPD (chronic obstructive pulmonary disease) (H)      Diabetes (H)      HLD (hyperlipidemia)      Hx antineoplastic chemotherapy      Hx of radiation therapy      Hypertension      IVCD (intraventricular conduction defect)      LBBB (left bundle branch block)      VT (ventricular tachycardia) (H)      Past Surgical History:   Procedure Laterality Date     BIOPSY BREAST Left 2001     IMPLANT AUTOMATIC IMPLANTABLE CARDIOVERTER DEFIBRILLATOR       LUMPECTOMY BREAST Left 2001     Family History   Problem Relation Age of Onset     No Known Problems Mother      No Known Problems Father      No Known Problems Sister      No Known Problems Daughter      No Known Problems Maternal Grandmother      No Known Problems Maternal Grandfather      No Known Problems Paternal Grandmother      No Known Problems Paternal Grandfather      No Known Problems Maternal Aunt      No Known Problems Paternal Aunt      Hereditary Breast and Ovarian Cancer Syndrome No family hx of      Breast Cancer No family hx of      Cancer No family hx of      Colon Cancer No family hx of      Endometrial Cancer No family hx of      Ovarian Cancer No family hx of         Social History     Socioeconomic History     Marital status:      Spouse name: Not on file     Number of children: Not on file     Years of education: Not on file     Highest education level: Not on file   Occupational History     Not on file   Tobacco Use     Smoking status: Current Every  Day Smoker     Packs/day: 1.00     Years: 44.00     Pack years: 44.00     Types: Cigarettes     Smokeless tobacco: Never Used   Substance and Sexual Activity     Alcohol use: Not on file     Drug use: Not on file     Sexual activity: Not on file   Other Topics Concern     Not on file   Social History Narrative     Not on file     Social Determinants of Health     Financial Resource Strain:      Difficulty of Paying Living Expenses:    Food Insecurity:      Worried About Running Out of Food in the Last Year:      Ran Out of Food in the Last Year:    Transportation Needs:      Lack of Transportation (Medical):      Lack of Transportation (Non-Medical):    Physical Activity:      Days of Exercise per Week:      Minutes of Exercise per Session:    Stress:      Feeling of Stress :    Social Connections:      Frequency of Communication with Friends and Family:      Frequency of Social Gatherings with Friends and Family:      Attends [employer] Services:      Active Member of Clubs or Organizations:      Attends Club or Organization Meetings:      Marital Status:    Intimate Partner Violence:      Fear of Current or Ex-Partner:      Emotionally Abused:      Physically Abused:      Sexually Abused:          Medications  Allergies   Current Outpatient Medications   Medication Sig Dispense Refill     aspirin (ASA) 81 MG chewable tablet Take 81 mg by mouth daily       atorvastatin (LIPITOR) 20 MG tablet Take 20 mg by mouth At Bedtime       calcium carbonate-vitamin D (OYSTER SHELL CALCIUM/D) 500-200 MG-UNIT tablet Take 1 tablet by mouth daily       carvedilol (COREG) 12.5 MG tablet Take 1.5 tablets by mouth 2 times daily       ENTRESTO 49-51 MG per tablet Take 1 tablet by mouth 2 times daily       furosemide (LASIX) 20 MG tablet Take 20 mg by mouth every morning       multivitamin, therapeutic (THERA-VIT) TABS tablet Take 1 tablet by mouth daily          Allergies   Allergen Reactions     Codeine Nausea and Vomiting      Penicillins Nestor Cm MD

## 2021-09-13 NOTE — PRE-PROCEDURE
GENERAL PRE-PROCEDURE:   Procedure:  Coronary angiogram with possible PCI  Date/Time:  9/13/2021 11:54 AM    Written consent obtained?: Yes    Risks and benefits: Risks, benefits and alternatives were discussed    Consent given by:  Patient  Patient states understanding of procedure being performed: Yes    Patient's understanding of procedure matches consent: Yes    Procedure consent matches procedure scheduled: Yes    Expected level of sedation:  Moderate  Appropriately NPO:  Yes  ASA Class:  3 (non-obstructive CAD, HFrEF, LBBB, CRT-D in situ, COPD; current tobacco use, HTN, DM Type II, Obese)  Mallampati  :  Grade 1- soft palate, uvula, tonsillar pillars, and posterior pharyngeal wall visible  Lungs:  Lungs clear with good breath sounds bilaterally  Heart:  A-fib  History & Physical reviewed:  History and physical reviewed and no updates needed  Statement of review:  I have reviewed the lab findings, diagnostic data, medications, and the plan for sedation

## 2021-09-14 VITALS
HEART RATE: 81 BPM | DIASTOLIC BLOOD PRESSURE: 64 MMHG | SYSTOLIC BLOOD PRESSURE: 111 MMHG | WEIGHT: 190.8 LBS | BODY MASS INDEX: 33.81 KG/M2 | OXYGEN SATURATION: 95 % | RESPIRATION RATE: 20 BRPM | TEMPERATURE: 98.4 F | HEIGHT: 63 IN

## 2021-09-14 PROBLEM — I48.0 PAROXYSMAL ATRIAL FIBRILLATION (H): Status: ACTIVE | Noted: 2021-09-14

## 2021-09-14 PROBLEM — J45.901 REACTIVE AIRWAY DISEASE WITH ACUTE EXACERBATION: Status: ACTIVE | Noted: 2021-09-14

## 2021-09-14 LAB
ANION GAP SERPL CALCULATED.3IONS-SCNC: 11 MMOL/L (ref 5–18)
ATRIAL RATE - MUSE: 69 BPM
ATRIAL RATE - MUSE: 77 BPM
BUN SERPL-MCNC: 11 MG/DL (ref 8–22)
CALCIUM SERPL-MCNC: 8.3 MG/DL (ref 8.5–10.5)
CHLORIDE BLD-SCNC: 109 MMOL/L (ref 98–107)
CHOLEST SERPL-MCNC: 87 MG/DL
CO2 SERPL-SCNC: 20 MMOL/L (ref 22–31)
CREAT SERPL-MCNC: 0.71 MG/DL (ref 0.6–1.1)
DIASTOLIC BLOOD PRESSURE - MUSE: 63 MMHG
DIASTOLIC BLOOD PRESSURE - MUSE: NORMAL MMHG
ERYTHROCYTE [DISTWIDTH] IN BLOOD BY AUTOMATED COUNT: 13.4 % (ref 10–15)
GFR SERPL CREATININE-BSD FRML MDRD: 88 ML/MIN/1.73M2
GLUCOSE BLD-MCNC: 197 MG/DL (ref 70–125)
HCT VFR BLD AUTO: 37.8 % (ref 35–47)
HDLC SERPL-MCNC: 35 MG/DL
HGB BLD-MCNC: 12.5 G/DL (ref 11.7–15.7)
INTERPRETATION ECG - MUSE: NORMAL
INTERPRETATION ECG - MUSE: NORMAL
LDLC SERPL CALC-MCNC: 37 MG/DL
MAGNESIUM SERPL-MCNC: 1.9 MG/DL (ref 1.8–2.6)
MCH RBC QN AUTO: 30.9 PG (ref 26.5–33)
MCHC RBC AUTO-ENTMCNC: 33.1 G/DL (ref 31.5–36.5)
MCV RBC AUTO: 93 FL (ref 78–100)
P AXIS - MUSE: NORMAL DEGREES
P AXIS - MUSE: NORMAL DEGREES
PLATELET # BLD AUTO: 189 10E3/UL (ref 150–450)
POTASSIUM BLD-SCNC: 4.1 MMOL/L (ref 3.5–5)
PR INTERVAL - MUSE: NORMAL MS
PR INTERVAL - MUSE: NORMAL MS
QRS DURATION - MUSE: 118 MS
QRS DURATION - MUSE: 126 MS
QT - MUSE: 366 MS
QT - MUSE: 380 MS
QTC - MUSE: 525 MS
QTC - MUSE: 556 MS
R AXIS - MUSE: 65 DEGREES
R AXIS - MUSE: 85 DEGREES
RBC # BLD AUTO: 4.05 10E6/UL (ref 3.8–5.2)
SODIUM SERPL-SCNC: 140 MMOL/L (ref 136–145)
SYSTOLIC BLOOD PRESSURE - MUSE: 93 MMHG
SYSTOLIC BLOOD PRESSURE - MUSE: NORMAL MMHG
T AXIS - MUSE: 74 DEGREES
T AXIS - MUSE: 83 DEGREES
TRIGL SERPL-MCNC: 77 MG/DL
VENTRICULAR RATE- MUSE: 115 BPM
VENTRICULAR RATE- MUSE: 139 BPM
WBC # BLD AUTO: 7.9 10E3/UL (ref 4–11)

## 2021-09-14 PROCEDURE — 80061 LIPID PANEL: CPT | Performed by: INTERNAL MEDICINE

## 2021-09-14 PROCEDURE — 85014 HEMATOCRIT: CPT | Performed by: INTERNAL MEDICINE

## 2021-09-14 PROCEDURE — 250N000013 HC RX MED GY IP 250 OP 250 PS 637: Performed by: INTERNAL MEDICINE

## 2021-09-14 PROCEDURE — 36415 COLL VENOUS BLD VENIPUNCTURE: CPT | Performed by: INTERNAL MEDICINE

## 2021-09-14 PROCEDURE — 250N000012 HC RX MED GY IP 250 OP 636 PS 637: Performed by: INTERNAL MEDICINE

## 2021-09-14 PROCEDURE — 99239 HOSP IP/OBS DSCHRG MGMT >30: CPT | Performed by: INTERNAL MEDICINE

## 2021-09-14 PROCEDURE — 83735 ASSAY OF MAGNESIUM: CPT | Performed by: INTERNAL MEDICINE

## 2021-09-14 PROCEDURE — 82947 ASSAY GLUCOSE BLOOD QUANT: CPT | Performed by: INTERNAL MEDICINE

## 2021-09-14 PROCEDURE — 999N000157 HC STATISTIC RCP TIME EA 10 MIN

## 2021-09-14 PROCEDURE — 99407 BEHAV CHNG SMOKING > 10 MIN: CPT

## 2021-09-14 RX ORDER — NICOTINE 21 MG/24HR
1 PATCH, TRANSDERMAL 24 HOURS TRANSDERMAL DAILY
Qty: 30 PATCH | Refills: 0 | Status: SHIPPED | OUTPATIENT
Start: 2021-09-15 | End: 2023-05-03

## 2021-09-14 RX ORDER — CARVEDILOL 3.12 MG/1
6.25 TABLET ORAL ONCE
Status: COMPLETED | OUTPATIENT
Start: 2021-09-14 | End: 2021-09-14

## 2021-09-14 RX ORDER — GUAIFENESIN/DEXTROMETHORPHAN 100-10MG/5
5 SYRUP ORAL EVERY 4 HOURS PRN
COMMUNITY
Start: 2021-09-14 | End: 2023-05-03

## 2021-09-14 RX ORDER — AMIODARONE HYDROCHLORIDE 200 MG/1
TABLET ORAL
Qty: 42 TABLET | Refills: 0 | Status: SHIPPED | OUTPATIENT
Start: 2021-09-14 | End: 2023-05-03

## 2021-09-14 RX ORDER — PREDNISONE 20 MG/1
40 TABLET ORAL DAILY
Qty: 6 TABLET | Refills: 0 | Status: SHIPPED | OUTPATIENT
Start: 2021-09-15 | End: 2021-09-18

## 2021-09-14 RX ORDER — BUPROPION HYDROCHLORIDE 150 MG/1
TABLET, EXTENDED RELEASE ORAL
Qty: 60 TABLET | Refills: 0 | Status: SHIPPED | OUTPATIENT
Start: 2021-09-14 | End: 2023-05-03

## 2021-09-14 RX ADMIN — APIXABAN 5 MG: 5 TABLET, FILM COATED ORAL at 09:04

## 2021-09-14 RX ADMIN — SACUBITRIL AND VALSARTAN 1 TABLET: 49; 51 TABLET, FILM COATED ORAL at 09:05

## 2021-09-14 RX ADMIN — ATORVASTATIN CALCIUM 40 MG: 40 TABLET, FILM COATED ORAL at 09:04

## 2021-09-14 RX ADMIN — GUAIFENESIN AND DEXTROMETHORPHAN 5 ML: 100; 10 SYRUP ORAL at 00:13

## 2021-09-14 RX ADMIN — PREDNISONE 40 MG: 20 TABLET ORAL at 09:04

## 2021-09-14 RX ADMIN — AMIODARONE HYDROCHLORIDE 200 MG: 200 TABLET ORAL at 09:04

## 2021-09-14 RX ADMIN — FUROSEMIDE 20 MG: 20 TABLET ORAL at 09:03

## 2021-09-14 RX ADMIN — CARVEDILOL 6.25 MG: 3.12 TABLET, FILM COATED ORAL at 10:24

## 2021-09-14 RX ADMIN — SACUBITRIL AND VALSARTAN 1 TABLET: 49; 51 TABLET, FILM COATED ORAL at 00:12

## 2021-09-14 RX ADMIN — NICOTINE 1 PATCH: 14 PATCH, EXTENDED RELEASE TRANSDERMAL at 09:04

## 2021-09-14 RX ADMIN — CARVEDILOL 12.5 MG: 12.5 TABLET, FILM COATED ORAL at 09:04

## 2021-09-14 RX ADMIN — ASPIRIN 81 MG: 81 TABLET, COATED ORAL at 09:04

## 2021-09-14 ASSESSMENT — MIFFLIN-ST. JEOR: SCORE: 1369.59

## 2021-09-14 NOTE — DISCHARGE SUMMARY
[facility] MEDICINE  DISCHARGE SUMMARY     Primary Care Physician: Gerardo Negrete  Admission Date: 9/12/2021   Discharge Provider: Juany White MD Discharge Date: 9/14/2021   Diet:   Active Diet and Nourishment Order   Procedures     Diet       Code Status: Full code    Activity: DCACTIVITY: Activity as tolerated        Condition at Discharge: Stable     REASON FOR PRESENTATION(See Admission Note for Details)     Chest pain, palpitations    PRINCIPAL & ACTIVE DISCHARGE DIAGNOSES     Principal Problem:    Ventricular tachycardia (H)  Active Problems:    Atherosclerosis of coronary artery without angina pectoris    Biventricular cardiac pacemaker in situ    Cardiomyopathy (H)    Tobacco use    Paroxysmal atrial fibrillation (H)    Reactive airway disease with acute exacerbation  Hypokalemia  Acute kidney injury    PENDING LABS     Unresulted Labs Ordered in the Past 30 Days of this Admission     No orders found from 8/13/2021 to 9/13/2021.            PROCEDURES ( this hospitalization only)      Procedure(s):  Coronary Angiogram  Left Heart Cath    RECOMMENDATIONS TO OUTPATIENT PROVIDER FOR F/U VISIT     Follow-up Appointments     Follow-up and recommended labs and tests       Follow up with primary care provider, Gerardo Negrete, within 7 days for   hospital follow- up.  No follow up labs or test are needed.  Follow up with your primary Cardiologist in 2-4 weeks             DISPOSITION     Home    SUMMARY OF HOSPITAL COURSE:      Janna Quispe is a 66 year old old female with past medical history of mild to moderate nonobstructive CAD, nonischemic cardiomyopathy LVEF 30%, VT, LBBB, s/p CRT-D, tobacco use, hyperlipidemia, hypertension, prediabetes, ongoing smoking, breast cancer s/p mastectomy and Adriamycin 2001, who presented to [facility] for evaluation of chest pain and palpitations, found to have A. fib RVR and nonsustained VT. She converted to normal sinus rhythm  after initiation of amiodarone.  She continues to have occasional episodes of nonsustained ventricular tachycardia which are asymptomatic.  Echocardiogram during this admission demonstrated decline in LV systolic function and possible early LV thrombus.  Coronary angiography demonstrated mild to moderate disease, grossly unchanged since 2017.  Patient does not appear volume overloaded and feels overall improved.  She will follow-up with her primary cardiologist as an outpatient.  Patient was started on a short prednisone course for reactive airway disease given new congested cough and scattered wheezes on exam.  Patient is interested in smoking cessation and was discharged on nicotine replacement and bupropion.        Discharge Medications with Med changes:     Discharge Medication List as of 9/14/2021 12:10 PM      START taking these medications    Details   amiodarone (PACERONE) 200 MG tablet Take 200 mg (1 tab) PO BID for 2 weeks, then 200 mg (1 tab) daily, Disp-42 tablet, R-0, E-Prescribe      apixaban ANTICOAGULANT (ELIQUIS) 5 MG tablet Take 1 tablet (5 mg) by mouth 2 times daily, Disp-60 tablet, R-0, E-Prescribe      buPROPion (WELLBUTRIN SR) 150 MG 12 hr tablet Take 150 mg (1 tab) PO daily for 3 days, then increase to 150 mg (1 tab) BID, Disp-60 tablet, R-0, E-Prescribe      guaiFENesin-dextromethorphan (ROBITUSSIN DM) 100-10 MG/5ML syrup Take 5 mLs by mouth every 4 hours as needed for cough, OTC      nicotine (NICODERM CQ) 14 MG/24HR 24 hr patch Place 1 patch onto the skin daily, Disp-30 patch, R-0, E-Prescribe      predniSONE (DELTASONE) 20 MG tablet Take 2 tablets (40 mg) by mouth daily for 3 days, Disp-6 tablet, R-0, E-Prescribe         CONTINUE these medications which have NOT CHANGED    Details   aspirin (ASA) 81 MG chewable tablet Take 81 mg by mouth daily, Historical      atorvastatin (LIPITOR) 20 MG tablet Take 20 mg by mouth At Bedtime, Historical      calcium carbonate-vitamin D (OYSTER SHELL  CALCIUM/D) 500-200 MG-UNIT tablet Take 1 tablet by mouth daily, Historical      carvedilol (COREG) 12.5 MG tablet Take 1.5 tablets by mouth 2 times daily, Historical      ENTRESTO 49-51 MG per tablet Take 1 tablet by mouth 2 times daily, AMADEO, Historical      furosemide (LASIX) 20 MG tablet Take 20 mg by mouth every morning, Historical      multivitamin, therapeutic (THERA-VIT) TABS tablet Take 1 tablet by mouth daily, Historical                   Rationale for medication changes:      Amiodarone started for A. fib control  Eliquis started for CVA prevention  Prednisone x3 more days for reactive airway with exacerbation   Nicotine patch and bupropion for smoking cessation        Consults       PHARMACY IP CONSULT  PHARMACY IP CONSULT  SOCIAL WORK IP CONSULT  PHARMACY IP CONSULT  PHARMACY IP CONSULT  SMOKING CESSATION PROGRAM IP CONSULT    Immunizations given this encounter     Most Recent Immunizations   Administered Date(s) Administered     COVID-19,PF,Moderna 02/26/2021           Anticoagulation Information      Recent INR results: No results for input(s): INR in the last 168 hours.  Warfarin doses (if applicable) or name of other anticoagulant: Eliquis      SIGNIFICANT IMAGING FINDINGS     Results for orders placed or performed during the hospital encounter of 09/12/21   XR Chest Port 1 View    Impression    IMPRESSION: There is an AICD. The leads are incompletely visualized but appear in appropriate position on this single view. Heart size appears normal. There is pulmonary vascular congestion. The previously seen interstitial edema persists, but is   improved.    Right shoulder prosthesis.   Echocardiogram Complete   Result Value Ref Range    LVEF  20-25%        SIGNIFICANT LABORATORY FINDINGS     Most Recent 3 CBC's:Recent Labs   Lab Test 09/14/21  0231 09/13/21  0520 09/12/21  0932   WBC 7.9 7.8 12.4*   HGB 12.5 13.3 15.3   MCV 93 93 93    194 250     Most Recent 3 BMP's:Recent Labs   Lab Test  09/14/21  0231 09/13/21  1833 09/13/21  0520 09/12/21  0932     --  140 139   POTASSIUM 4.1 3.6 3.4* 3.9   CHLORIDE 109*  --  107 103   CO2 20*  --  24 23   BUN 11  --  11 17   CR 0.71  --  0.71 1.12*   ANIONGAP 11  --  9 13   HECTOR 8.3*  --  8.7 9.5   *  --  149* 239*           Discharge Orders        Reason for your hospital stay    Atrial fibrillation and ventricular tachycardia     Follow-up and recommended labs and tests     Follow up with primary care provider, Gerardo Negrete, within 7 days for hospital follow- up.  No follow up labs or test are needed.  Follow up with your primary Cardiologist in 2-4 weeks     Activity    Your activity upon discharge: activity as tolerated     Diet    Follow this diet upon discharge: Orders Placed This Encounter      Low Saturated Fat Na <2400 mg       Examination   Physical Exam   Temp:  [98.2  F (36.8  C)-98.4  F (36.9  C)] 98.4  F (36.9  C)  Pulse:  [80-86] 81  Resp:  [18-20] 20  BP: ()/(61-64) 111/64  SpO2:  [91 %-95 %] 95 %  Wt Readings from Last 1 Encounters:   09/14/21 86.5 kg (190 lb 12.8 oz)     Gen: NAD  CV: RRR. Normal S1S2. No murmur  Lungs:  Scattered rhonchi   Abdomen: Soft and nontender  Extremities: No edema        Please see EMR for more detailed significant labs, imaging, consultant notes etc.    I, Juany White MD, personally saw the patient today and spent greater than 30 minutes discharging this patient.    Juany White MD  [facility]    CC:Gerardo Nergete

## 2021-09-14 NOTE — CONSULTS
Tobacco Treatment Consult  9/14/2021, 11:00 AM    Patient Admitted for: Palpitations [R00.2]  Acute on chronic combined systolic and diastolic heart failure (H) [I50.43]  Ventricular tachycardia (H) [I47.2]  SOB (shortness of breath) [R06.02]  Chest pain, unspecified type [R07.9] on 9/12/2021    History of Tobacco Use:   Tobacco Product(s):cigarette  Amount used: 3/4 pack per day  Number of quit attempts in past: Has tried cutting down several times and has periodically successful in cutting down to 1/2 pack/day  Longest period of without use: No periods of complete cessation  Pharmacotherapy tried in the past: gum and Varenicline (Chantix)  Pharmacotherapy tried that has been beneficial: Chantix worked for a period but then she started having nightmares that were intolerable  Pharmacotherapy tried that has not been beneficial or was not tolerated: gum and Varenicline (Chantix)  Fagerstrom Score: 7 Level of dependence 4-7 moderate  Medical co-morbidities impacting tobacco use: anxiety    Assessment:   Importance of quitting to patient: 9  Current confidence in ability to quit: 9  Readiness to quit/planning to quit: ready  Reasons for wanting to quit: health and wants to extend life to enjoy her family   Emotional Triggers:sadness and nervous or stressed  Physical and behavioral triggers: driving and eating a meal  Nicotine withdrawal symptoms experiencing as an inpatient: nervousness and anxiety  Current major life stressors: experiencing a major health problem  Barriers to quitting: none identified  -Janna is motivated to quit. She has tried to cut down for years and we discussed changing her strategy from trying to cut down to trying to quit. We established her quit date to be 9/11/21.She has a strong support system in her  and children. She has also been experiencing anxiety for the past few years that she has not sought out or received any treatment for. She would be willing to try Bupropion in an effort  to aide in her smoking cessation and reducing anxiety.    Education done during visit:  -Discussed triggers and response to them  -Discussed benefits to quitting tobacco use  -Educated on physical benefits to health of tobacco cessation  -Education on use of pharmacotherapy products and discussed options to determine what may work best for patient.  -Discussed barriers to quitting and how patient feels they may overcome them  -Educated on benefits of having a support system and remembering their reasons for quitting  -Issued tobacco cessation materials and resource information.      Recommendations:  -As an inpatient the patient have the following pharmacotherapy: Patch 21 mg  -Upon discharge recommend the patient have the following pharmacotherapy: Patch 21 mg and bupropion (Wellbutrin)  -Taper NRT recommended after 4-6 weeks of successful cessation.  Patch down to 14 mg for 4 weeks then 7 mg after 2-4 weeks.   -Continued cessation therapy by this service via phone    Janna will participate in follow-up calls post-discharge. Will continue to be available to patient throughout hospital stay.    Total 65 minutes spent in smoking cessation, and 35 minutes spent in care coordination and documentation.    Aric Khan RT, Chronic Pulmonary Disease Specialist, Tobacco Treatment Specialist  Phone [phone]

## 2021-09-14 NOTE — PROGRESS NOTES
"At approx 01:03, pt with approximately 16 beat run of monomorphic VT.  Rate trending 130-140's.  Initially, pt observed sleeping.  However, pt disclosed that at approximately the same time as the burst, she endorsed having a \"warm flash\".  MN telemetry showing NSR with prolonged QT (see flowsheet).    House Resident (Tere) present on P3 at time of episode.  House Resident notified and acknowledged aforementioned.    AM labs drawn: BMP, Mag, CBC.  Results pending.  Addendum: Labs unremarkable.  House Resident text msg'ed unremarkable lab results.     "

## 2021-09-14 NOTE — PLAN OF CARE
Problem: Adult Inpatient Plan of Care  Goal: Optimal Comfort and Wellbeing  Outcome: Improving     Problem: Dysrhythmia  Goal: Normalized Cardiac Rhythm  Outcome: Change based on patient need/priority     Patient calm and pleasant, denies pain. Has dry cough, requesting robitussin, awaiting from pharmacy. Patient had a 6 beat run of v-tach this evening, only occurred once. MD notified, no new orders. Continuing to monitor. TR band removed without difficulties.

## 2021-09-14 NOTE — PLAN OF CARE
Problem: Dysrhythmia  Goal: Normalized Cardiac Rhythm  Outcome: Declining  VSS.  Denies pain/discomfort.  No SOB/LAWLER.  See note regarding V-tach burst.  Pending possible discharge today.

## 2021-09-14 NOTE — PROVIDER NOTIFICATION
Text Page to On Call Physician:  At 1845 she had a 6 beat run of V-Tach. She was not symptomatic and was just sitting and eating at the edge of her bed. Will continue to monitor.

## 2021-09-14 NOTE — DISCHARGE INSTRUCTIONS
Interventional Cardiology  Coronary Angiogram/Angioplasty/Stent/Atherectomy Discharge  Instructions -   Radial (wrist) Approach     The instructions below are to help you understand how to take care of yourself. There is also information about when to call the doctor or emergency services.    **Do not stop your aspirin or platelet inhibitor unless directed by your Cardiologist.  These medications help to prevent platelets in your blood from sticking together and forming a clot.   Examples of these medications are: Ticagrelor (Brilinta), Clopidogrel (Plavix), Prasugrel (Effient)    For 24 hours after procedure:    Do not subject hand/arm to any forceful movements for 24 hours, such as supporting weight when rising from a chair or bed.    Do not drive a car for 24 hours.    The dressing on the puncture site may be removed after 24 hours and left open to air. If minor oozing, you may apply a Band-Aid and remove after 12 hours.     You may shower on the day after your procedure. Do not take a tub bath or wash dishes (no soaking wrist) with the puncture site in water for 3 days after the procedure.    For 48 hours following the procedure:    Do not operate a lawnmower, motorcycle, chainsaw or all-terrain vehicle.    Do not lift anything heavier than 5-10 pounds with affected arm for 5 days.    Avoid excessive bending (flexion/extension) wrist movement.    Do not engage in vigorous exercise (i.e. tennis, golf) using the affected arm for 5 days after discharge.    You may return to work in 72 hours if no complications and no heavy lifting.    If bleeding should occur following discharge:    Sit down and apply firm pressure with your thumb against the puncture site and fingers against back of wrist for 10 minutes.    If the bleeding stops, continue to rest, keeping your wrist still for 2 hours. Notify your doctor as soon as possible.    If bleeding does not stop after 10 minutes or if there is a large amount of bleeding or  spurting, call 911 immediately. Do not drive yourself to the hospital.           Contact the Heart Clinic at [phone] if you develop:    Fever over 100.4, that lasts more than one day    Redness, heat, or pus at the puncture site    Change in color or temperature of the hand or arm    Expect mild tingling of hand and tenderness at the puncture site for up to 3 days. You may take Tylenol or a pain medicine recommended by your doctor.                       Our Cardiac Rehab staff may visit briefly with you while your in the hospital.   If they miss you, someone will contact you after you are home.  You are encouraged to enroll in an Outpatient Cardiac Rehab Program     No elective dental work for 6 weeks after having a stent    If you are on metformin, ActoPlus Met , Glucophage , Glucovance , Avandamet , Fortamet , Glumetza , Janumet , Riomet , PrandiMet, OR Metaglip , resume this medication only after your kidney function test is done and you are told to do so by your primary care provider.      Your Procedural Physician was: {CV Physicians:[id number]}  the phone number is: [phone]    [facility]:  [phone]  If you are calling after hours, please listen to the entire voicemail, a live  will answer at the end of the message  ***********FOLLOW UP APPT. ON TUES. 9/21 AT 11:30AM WITH DR. J LUIS SPENCE AT [facility]*****.

## 2021-09-14 NOTE — PROVIDER NOTIFICATION
Text Page to Dr. White:  At 1845 she had a 6 beat run of V-Tach. She was not symptomatic and was just sitting and eating at the edge of her bed. Will continue to monitor.

## 2021-09-15 ENCOUNTER — PATIENT OUTREACH (OUTPATIENT)
Dept: CARE COORDINATION | Facility: CLINIC | Age: 66
End: 2021-09-15

## 2021-09-15 DIAGNOSIS — Z71.89 OTHER SPECIFIED COUNSELING: ICD-10-CM

## 2021-09-15 NOTE — PROGRESS NOTES
Clinic Care Coordination Contact    Patient identified for care management outreach, however patient is not on a value based contract so cannot complete outreach. Will escalate to clinic staff if specific needs or resources are indicated.    RAHEEL Webber/JOHN Care Coordination Supervisor  [facility] - Care Coordination   9/15/2021 10:36 AM  [phone]

## 2021-09-21 ENCOUNTER — LAB REQUISITION (OUTPATIENT)
Dept: LAB | Facility: CLINIC | Age: 66
End: 2021-09-21

## 2021-09-21 DIAGNOSIS — E78.2 MIXED HYPERLIPIDEMIA: ICD-10-CM

## 2021-09-21 DIAGNOSIS — E11.9 TYPE 2 DIABETES MELLITUS WITHOUT COMPLICATIONS (H): ICD-10-CM

## 2021-09-21 LAB
ALBUMIN SERPL-MCNC: 3.5 G/DL (ref 3.5–5)
ALP SERPL-CCNC: 68 U/L (ref 45–120)
ALT SERPL W P-5'-P-CCNC: 32 U/L (ref 0–45)
ANION GAP SERPL CALCULATED.3IONS-SCNC: 11 MMOL/L (ref 5–18)
AST SERPL W P-5'-P-CCNC: 24 U/L (ref 0–40)
BILIRUB SERPL-MCNC: 1.6 MG/DL (ref 0–1)
BUN SERPL-MCNC: 8 MG/DL (ref 8–22)
CALCIUM SERPL-MCNC: 8.9 MG/DL (ref 8.5–10.5)
CHLORIDE BLD-SCNC: 101 MMOL/L (ref 98–107)
CHOLEST SERPL-MCNC: 126 MG/DL
CO2 SERPL-SCNC: 31 MMOL/L (ref 22–31)
CREAT SERPL-MCNC: 0.74 MG/DL (ref 0.6–1.1)
GFR SERPL CREATININE-BSD FRML MDRD: 84 ML/MIN/1.73M2
GLUCOSE BLD-MCNC: 120 MG/DL (ref 70–125)
HDLC SERPL-MCNC: 45 MG/DL
LDLC SERPL CALC-MCNC: <5 MG/DL
POTASSIUM BLD-SCNC: 3.6 MMOL/L (ref 3.5–5)
PROT SERPL-MCNC: 6.5 G/DL (ref 6–8)
SODIUM SERPL-SCNC: 143 MMOL/L (ref 136–145)
TRIGL SERPL-MCNC: 386 MG/DL

## 2021-09-21 PROCEDURE — 36415 COLL VENOUS BLD VENIPUNCTURE: CPT | Performed by: FAMILY MEDICINE

## 2021-09-21 PROCEDURE — 80061 LIPID PANEL: CPT | Performed by: FAMILY MEDICINE

## 2021-09-21 PROCEDURE — 82040 ASSAY OF SERUM ALBUMIN: CPT | Performed by: FAMILY MEDICINE

## 2021-09-22 ENCOUNTER — TELEPHONE (OUTPATIENT)
Dept: RESPIRATORY THERAPY | Facility: HOSPITAL | Age: 66
End: 2021-09-22

## 2021-09-30 ENCOUNTER — TELEPHONE (OUTPATIENT)
Dept: RESPIRATORY THERAPY | Facility: HOSPITAL | Age: 66
End: 2021-09-30

## 2021-10-11 ENCOUNTER — LAB REQUISITION (OUTPATIENT)
Dept: LAB | Facility: CLINIC | Age: 66
End: 2021-10-11

## 2021-10-11 DIAGNOSIS — R42 DIZZINESS AND GIDDINESS: ICD-10-CM

## 2021-10-11 LAB
ALBUMIN SERPL-MCNC: 3.8 G/DL (ref 3.5–5)
ALP SERPL-CCNC: 72 U/L (ref 45–120)
ALT SERPL W P-5'-P-CCNC: 19 U/L (ref 0–45)
ANION GAP SERPL CALCULATED.3IONS-SCNC: 12 MMOL/L (ref 5–18)
AST SERPL W P-5'-P-CCNC: 22 U/L (ref 0–40)
BILIRUB SERPL-MCNC: 1.1 MG/DL (ref 0–1)
BUN SERPL-MCNC: 11 MG/DL (ref 8–22)
CALCIUM SERPL-MCNC: 9.8 MG/DL (ref 8.5–10.5)
CHLORIDE BLD-SCNC: 101 MMOL/L (ref 98–107)
CO2 SERPL-SCNC: 27 MMOL/L (ref 22–31)
CREAT SERPL-MCNC: 0.77 MG/DL (ref 0.6–1.1)
GFR SERPL CREATININE-BSD FRML MDRD: 80 ML/MIN/1.73M2
GLUCOSE BLD-MCNC: 122 MG/DL (ref 70–125)
POTASSIUM BLD-SCNC: 3 MMOL/L (ref 3.5–5)
PROT SERPL-MCNC: 6.9 G/DL (ref 6–8)
SODIUM SERPL-SCNC: 140 MMOL/L (ref 136–145)

## 2021-10-11 PROCEDURE — 80053 COMPREHEN METABOLIC PANEL: CPT | Performed by: STUDENT IN AN ORGANIZED HEALTH CARE EDUCATION/TRAINING PROGRAM

## 2021-10-18 ENCOUNTER — TELEPHONE (OUTPATIENT)
Dept: RESPIRATORY THERAPY | Facility: HOSPITAL | Age: 66
End: 2021-10-18

## 2021-10-18 NOTE — TELEPHONE ENCOUNTER
Smoking Cessation Counseling Phone Call    I spoke with Janna. she has been successful in cutting down on her cigarette usage to 3 or for cigarettes/day. I congratulated her on her progress and we discussed targeting a cigarette to eliminate from her daily routine. We identified the cigarette she has after her evening meal and discussed possible strategies to aid her in eliminating it. We agreed to speak again in a few weeks. She has my contact information should she have questions or desire further assistance and support prior to that.    Aric Khan, RT, TTS, Chronic Pulmonary Disease Specialist

## 2021-11-04 ENCOUNTER — LAB REQUISITION (OUTPATIENT)
Dept: LAB | Facility: CLINIC | Age: 66
End: 2021-11-04

## 2021-11-04 DIAGNOSIS — E87.6 HYPOKALEMIA: ICD-10-CM

## 2021-11-04 LAB — POTASSIUM BLD-SCNC: 4.5 MMOL/L (ref 3.5–5)

## 2021-11-04 PROCEDURE — 84132 ASSAY OF SERUM POTASSIUM: CPT | Performed by: STUDENT IN AN ORGANIZED HEALTH CARE EDUCATION/TRAINING PROGRAM

## 2021-12-21 ENCOUNTER — ANCILLARY PROCEDURE (OUTPATIENT)
Dept: MAMMOGRAPHY | Facility: CLINIC | Age: 66
End: 2021-12-21
Attending: STUDENT IN AN ORGANIZED HEALTH CARE EDUCATION/TRAINING PROGRAM
Payer: COMMERCIAL

## 2021-12-21 DIAGNOSIS — Z12.31 VISIT FOR SCREENING MAMMOGRAM: ICD-10-CM

## 2021-12-21 PROCEDURE — 77063 BREAST TOMOSYNTHESIS BI: CPT

## 2021-12-27 ENCOUNTER — TELEPHONE (OUTPATIENT)
Dept: RESPIRATORY THERAPY | Facility: HOSPITAL | Age: 66
End: 2021-12-27
Payer: COMMERCIAL

## 2021-12-27 NOTE — TELEPHONE ENCOUNTER
Smoking Cessation Counseling Phone Call    Left message along with my contact information should she have any questions or wish to speak to me regarding smoking cessation.    Aric Khan, RT, TTS, Chronic Pulmonary Disease Specialist'

## 2022-01-05 ENCOUNTER — TELEPHONE (OUTPATIENT)
Dept: RESPIRATORY THERAPY | Facility: HOSPITAL | Age: 67
End: 2022-01-05
Payer: COMMERCIAL

## 2022-07-03 ENCOUNTER — LAB REQUISITION (OUTPATIENT)
Dept: LAB | Facility: CLINIC | Age: 67
End: 2022-07-03

## 2022-07-03 DIAGNOSIS — J02.9 ACUTE PHARYNGITIS, UNSPECIFIED: ICD-10-CM

## 2022-07-03 PROCEDURE — 87081 CULTURE SCREEN ONLY: CPT | Performed by: NURSE PRACTITIONER

## 2022-07-05 LAB — BACTERIA SPEC CULT: NORMAL

## 2022-10-29 NOTE — TELEPHONE ENCOUNTER
Problem: Postpartum  Goal: Begins to establish milk supply to meet personal breastfeeding goals  Outcome: Outcome Not Met, Plan Adjusted  Note: Infant with high intermediate bili level so needed to supplement with formula.   Adequate pain relief with motrin and tylenol. Latching with min assist.  Continue with current plan.   Smoking Cessation Counseling Phone Call    I spoke with Yael. She has been successful in cutting down from 3/4 pack/day to about 4 cigarettes a day. She is using a 14 mg NRT patch. She is not yet readay to set a quit date. I congratulated o on her progress so far and we discussed strategies to help her attain he goal of total cessation. I will attempt to contact her in approximately 2 weeks for further counseling. She has my contact information should she have questions or desire further assistance and support.

## 2022-12-21 ENCOUNTER — TELEPHONE (OUTPATIENT)
Dept: FAMILY MEDICINE | Facility: CLINIC | Age: 67
End: 2022-12-21

## 2022-12-21 NOTE — TELEPHONE ENCOUNTER
Called patient to schedule an MTM appointment. Patient was referred by [facility] Medicare Part D Plan. Left message for patient to return call.  Aleksandra Aiken, Pharm.D, BCACP  Medication Therapy Management Pharmacist

## 2023-01-09 ENCOUNTER — ANCILLARY PROCEDURE (OUTPATIENT)
Dept: MAMMOGRAPHY | Facility: CLINIC | Age: 68
End: 2023-01-09
Attending: STUDENT IN AN ORGANIZED HEALTH CARE EDUCATION/TRAINING PROGRAM
Payer: COMMERCIAL

## 2023-01-09 DIAGNOSIS — Z12.31 VISIT FOR SCREENING MAMMOGRAM: ICD-10-CM

## 2023-01-09 PROCEDURE — 77067 SCR MAMMO BI INCL CAD: CPT

## 2023-03-06 ENCOUNTER — LAB REQUISITION (OUTPATIENT)
Dept: LAB | Facility: CLINIC | Age: 68
End: 2023-03-06

## 2023-03-06 ENCOUNTER — TRANSFERRED RECORDS (OUTPATIENT)
Dept: HEALTH INFORMATION MANAGEMENT | Facility: CLINIC | Age: 68
End: 2023-03-06

## 2023-03-06 DIAGNOSIS — E78.5 HYPERLIPIDEMIA, UNSPECIFIED: ICD-10-CM

## 2023-03-06 DIAGNOSIS — E11.9 TYPE 2 DIABETES MELLITUS WITHOUT COMPLICATIONS (H): ICD-10-CM

## 2023-03-06 LAB
ANION GAP SERPL CALCULATED.3IONS-SCNC: 15 MMOL/L (ref 7–15)
BUN SERPL-MCNC: 12.3 MG/DL (ref 8–23)
CALCIUM SERPL-MCNC: 9 MG/DL (ref 8.8–10.2)
CHLORIDE SERPL-SCNC: 101 MMOL/L (ref 98–107)
CHOLEST SERPL-MCNC: 113 MG/DL
CREAT SERPL-MCNC: 0.72 MG/DL (ref 0.51–0.95)
DEPRECATED HCO3 PLAS-SCNC: 24 MMOL/L (ref 22–29)
GFR SERPL CREATININE-BSD FRML MDRD: >90 ML/MIN/1.73M2
GLUCOSE SERPL-MCNC: 137 MG/DL (ref 70–99)
HBA1C MFR BLD: 6.6 % (ref 4.2–6.1)
HDLC SERPL-MCNC: 55 MG/DL
LDLC SERPL CALC-MCNC: 32 MG/DL
NONHDLC SERPL-MCNC: 58 MG/DL
POTASSIUM SERPL-SCNC: 3.7 MMOL/L (ref 3.4–5.3)
SODIUM SERPL-SCNC: 140 MMOL/L (ref 136–145)
TRIGL SERPL-MCNC: 131 MG/DL

## 2023-03-06 PROCEDURE — 80061 LIPID PANEL: CPT | Performed by: STUDENT IN AN ORGANIZED HEALTH CARE EDUCATION/TRAINING PROGRAM

## 2023-03-06 PROCEDURE — 80048 BASIC METABOLIC PNL TOTAL CA: CPT | Performed by: STUDENT IN AN ORGANIZED HEALTH CARE EDUCATION/TRAINING PROGRAM

## 2023-04-03 ENCOUNTER — TELEPHONE (OUTPATIENT)
Dept: PHARMACY | Facility: OTHER | Age: 68
End: 2023-04-03
Payer: COMMERCIAL

## 2023-05-03 ENCOUNTER — VIRTUAL VISIT (OUTPATIENT)
Dept: PHARMACY | Facility: PHYSICIAN GROUP | Age: 68
End: 2023-05-03
Payer: COMMERCIAL

## 2023-05-03 DIAGNOSIS — I50.23 ACUTE ON CHRONIC SYSTOLIC HEART FAILURE (H): Primary | ICD-10-CM

## 2023-05-03 DIAGNOSIS — E78.5 HYPERLIPIDEMIA LDL GOAL <70: ICD-10-CM

## 2023-05-03 DIAGNOSIS — E11.9 TYPE 2 DIABETES MELLITUS WITHOUT COMPLICATION, WITHOUT LONG-TERM CURRENT USE OF INSULIN (H): ICD-10-CM

## 2023-05-03 DIAGNOSIS — I48.0 PAROXYSMAL ATRIAL FIBRILLATION (H): ICD-10-CM

## 2023-05-03 DIAGNOSIS — Z72.0 TOBACCO USE: ICD-10-CM

## 2023-05-03 DIAGNOSIS — I10 ESSENTIAL HYPERTENSION: ICD-10-CM

## 2023-05-03 DIAGNOSIS — Z78.9 TAKES DIETARY SUPPLEMENTS: ICD-10-CM

## 2023-05-03 DIAGNOSIS — G47.9 SLEEP TROUBLE: ICD-10-CM

## 2023-05-03 PROCEDURE — 99607 MTMS BY PHARM ADDL 15 MIN: CPT | Performed by: PHARMACIST

## 2023-05-03 PROCEDURE — 99605 MTMS BY PHARM NP 15 MIN: CPT | Performed by: PHARMACIST

## 2023-05-03 RX ORDER — BUPROPION HYDROCHLORIDE 150 MG/1
150 TABLET, EXTENDED RELEASE ORAL 2 TIMES DAILY
COMMUNITY

## 2023-05-03 RX ORDER — SPIRONOLACTONE 25 MG/1
12.5 TABLET ORAL EVERY MORNING
COMMUNITY
Start: 2023-02-16

## 2023-05-03 NOTE — PROGRESS NOTES
Medication Therapy Management (MTM) Encounter    ASSESSMENT:                            Medication Adherence/Access: No issues identified    HFrEF, Hypertension:   Stable. Patient is meeting BP goal of < 130/80mmHg. EF 20% - 24% . Patient is on appropriate ACE/ARB and  is on appropriate beta blocker: Carvedilol (Coreg). Patient pulse is > 50.. Current weight is stable and diuretic dose seems to be appropriate. Patient is a candidate for mineralcorticoid receptor blocker. Patient is appropriately avoiding NSAID's, diltiazem/verapamil, and pioglitazone. Pt would benefit from no changes in current therapy.     Afib: Stable. [id number] score 6 indicates appropriate anticoagulation. Patient is on appropriate rate/rhythm control therapy.      Hyperlipidemia: LDL is at goal of <70 mg/dL.     Type 2 Diabetes: A1c is at goal of <7% without medication. Continue to monitor.     Tobacco abuse: Patient continues to use tobacco. We discussed: benefits of quitting and ways to cope with cravings and manage triggers. Evening bupropion dose may be impacting sleep. She may benefit from considering switch to once daily extended-release version, will discuss with primary provider.     Sleep: Unisom Beers Criteria: Avoid use in elderly patients due to strong anticholinergic effects and reduced clearance with advanced age which increase the risk of anticholinergic effects and toxicity. Tolerance develops when used as a hypnotic. Current occasional use is appropriate but continue to monitor. As noted above may benefit from switching to ER bupropion to see if this has less impact on sleep.     Supplements: Stable.     PLAN:                            1. Try taking bupropion in morning and early afternoon to see if this causes less issues with sleep. If you don't see a difference talk with Dr. Rubalcava about switcing to a long acting form you just take in the morning.     Follow-up: 6-12 months for annual medication  review    SUBJECTIVE/OBJECTIVE:                          Janna Quispe is a 67 year old female called for an initial visit. She was referred to me from [facility] insurance plan.      Reason for visit: Comprehensive medication review.    Allergies/ADRs: Reviewed in chart  Past Medical History: Reviewed in chart  Tobacco: She reports that she has been smoking cigarettes. She has a 44.00 pack-year smoking history. She has never used smokeless tobacco.Nicotine/Tobacco Cessation Plan:   Pharmacotherapies : bupropion (Zyban) and Nicotine patch  Alcohol: rare      Medication Adherence/Access: no issues reported    HFrEF, Hypertension:   sacubitril/valsartan (Entresto) 49/51mg twice daily   carvedilol 25 mg twice daily  spironolactone 12.5mg daily  furosemide 20 mg once daily, may take additional in afternoon as needed    Patient reports no current medication side effects. She usually only needs to take additional furosemide when she is traveling. Follows with cardiology, Dany Crockett MD.   ECHO:  Date 1/26/2022, EF 23%  Patient is not complaining of HF symptoms .    Patient is measuring daily weights  and reports stable.   Patient does self-monitor blood pressure. Home BP monitoring in range of 110's systolic over 75-78's diastolic.   Patient is following a low sodium diet, is avoiding EtOH.  Last OV BP (3/6/2023): 96/66 mmHg    Creatinine   Date Value Ref Range Status   03/06/2023 0.72 0.51 - 0.95 mg/dL Final     GFR Estimate   Date Value Ref Range Status   03/06/2023 >90 >60 mL/min/1.73m2 Final     Comment:     eGFR calculated using 2021 CKD-EPI equation.   09/28/2020 >60 >60 mL/min/1.73m2 Final       Potassium   Date Value Ref Range Status   03/06/2023 3.7 3.4 - 5.3 mmol/L Final   11/04/2021 4.5 3.5 - 5.0 mmol/L Final       Afib:   Eliquis 5mg twice daily for anticoagulation - does not have a history of GI bleed  Carvedilol 25 mg twice a day   Patient reports  no current concerns of bruising or bleeding, no current  medication side effects  Patient does self-monitor blood pressure. Home BP monitoring in range of 110's systolic over 75-78's diastolic    Hyperlipidemia:   atorvastatin 20 mg daily  Patient reports no significant myalgias or other side effects.  Recent Labs   Lab Test 03/06/23  1137 09/21/21  1250   CHOL 113 126   HDL 55 45*   LDL 32 <5   TRIG 131 386*     Type 2 Diabetes:   No medications  Controls with diet and lifestyle.   Symptoms of low blood sugar? none.   Symptoms of high blood sugar? none  Eye exam: due  Foot exam: up to date  Aspirin: Not taking due to taking Eliquis  Statin: atorvastatin  ACEi/ARB: entresto  A1c (03/06/2023): 6.6%         Tobacco abuse:   Bupropion  mg twice a day   Continues to work on quitting smoking. She is down to 5 cigarettes/day. She does think the bupropion is helpful for her. She does have some trouble sleeping. She has always been on the twice a day regimen. She also has nicotine patches but is not using since she continues smoking.     Sleep:   unisom 1 tablet at bedtime as needed   Taking maybe every 3 days. This was suggested by her primary provider. She does feel like it is helpful but causes some next day grogginess and dry mouth.     Supplements:   Multivitamin women 50+ 1 tablet daily  Calcium 600 mg once daily  No reported issues at this time. She had previously gotten combo calcium-vitamin D but she used benefit through her insurance and just realized this was only calcium without vitamin D but multivitamin does have vitamin D.     Today's Vitals: There were no vitals taken for this visit.  ----------------      I spent 13 minutes with this patient today. All changes were made via collaborative practice agreement with Nat Rubalcava MD. A copy of the visit note was provided to the patient's provider(s).    A summary of these recommendations was mailed to the patient.    Mariam Bryant, PharmD, BCACP  Medication Therapy Management Pharmacist  [facility]  Pager: [phone]      Telemedicine Visit Details  Type of service:  Telephone visit  Start Time: 9:00 AM  End Time: 9:13 AM     Medication Therapy Recommendations  Tobacco use    Current Medication: buPROPion (WELLBUTRIN SR) 150 MG 12 hr tablet   Rationale: Undesirable effect - Adverse medication event - Safety   Recommendation: Change Administration Time   Status: Accepted - no CPA Needed

## 2023-05-03 NOTE — LETTER
May 3, 2023  Janna Quispe  [address] [address]    Dear Ms. Quispe, [facility]     Thank you for talking with me on May 3, 2023 about your health and medications. As a follow-up to our conversation, I have included two documents:      Your Recommended To-Do List has steps you should take to get the best results from your medications.  Your Medication List will help you keep track of your medications and how to take them.    If you want to talk about these documents, please call Mariam Bryant PharmD at phone: [phone], Monday-Friday 8-4:30pm.    I look forward to working with you and your doctors to make sure your medications work well for you.    Sincerely,  Mariam Bryant, Micheal  [facility] Pharmacist, [facility]

## 2023-05-03 NOTE — LETTER
_  Medication List        Prepared on: May 3, 2023     Bring your Medication List when you go to the doctor, hospital, or   emergency room. And, share it with your family or caregivers.     Note any changes to how you take your medications.  Cross out medications when you no longer use them.    Medication How I take it Why I use it Prescriber   apixaban ANTICOAGULANT (ELIQUIS) 5 MG tablet Take 5 mg by mouth 2 times daily Prevent Stroke from Atrial Fibrillation Dany Crockett MD   atorvastatin (LIPITOR) 20 MG tablet Take 20 mg by mouth At Bedtime  High Cholesterol Nat WARD MD   buPROPion (WELLBUTRIN SR) 150 MG 12 hr tablet Take 150 mg by mouth 2 times daily Mood, Smoking Cessation Nat WARD MD   Calcium Carbonate (CALCIUM 600 PO) Take 1 tablet by mouth daily General Health Patient Reported   carvedilol (COREG) 12.5 MG tablet Take 25 mg by mouth 2 times daily Heart Failure Dany Crockett MD   doxylamine (UNISOM) 25 MG TABS tablet Take 25 mg by mouth nightly as needed for sleep Trouble Sleeping Patient Reported   ENTRESTO 49-51 MG per tablet Take 1 tablet by mouth 2 times daily Heart Failure Dany Crockett MD   furosemide (LASIX) 20 MG tablet Take 20 mg by mouth every morning , may take additional 20 mg tablet as needed Edema; Heart Failure Dany Crockett MD   Multiple Vitamins-Minerals (MULTIVITAMIN WOMEN 50+) TABS Take 1 tablet by mouth daily General Health Patient Reported   spironolactone (ALDACTONE) 25 MG tablet Take 12.5 mg by mouth every morning  Heart Failure Dany Crockett MD         Add new medications, over-the-counter drugs, herbals, vitamins, or  minerals in the blank rows below.    Medication How I take it Why I use it Prescriber                                      Allergies:      codeine; pravastatin; varenicline; penicillins        Side effects I have had:               Other Information:              My notes and questions:

## 2023-05-03 NOTE — LETTER
"Recommended To-Do List      Prepared on: May 3, 2023       You can get the best results from your medications by completing the items on this \"To-Do List.\"      Bring your To-Do List when you go to your doctor. And, share it with your family or caregivers.    My To-Do List:  What we talked about: What I should do:   An issue with your medication    Try taking bupropion in morning and early afternoon to see if this causes less issues with sleep. If you don't see a difference talk with Dr. Rubalcava about switcing to a long acting form you just take in the morning.           What we talked about: What I should do:    What my medicines are for, how to know if my medicines are working, made sure my medicines are safe for me and reviewed how to take my medicines.      Take my medicines every day                  "

## 2023-06-09 NOTE — Clinical Note
There were no immediate complications during the procedure.  Transition of Care Plan:    Return to . [facility] [address] pending stroke work up -[phone]- call at discharge and fax the discharge AVS and summary to [phone]. CM spoke with Terrance Rodriguez- they can provide PT, OT and SLP services if needed. [address]    The patent is ambulatory with out DME Dementia) The facility is requesting that the hospital provide transport. [facility] to Home. Legal Guardian Deja Greenberg [phone] - CM spoke with her briefly as she was in an appointment. The plan is for the patient to rreturn to [facility] at discharge. Requesting that the hospital set up the transport     RUR: Observation  Prior Level of Functioning: Ambulatory needing assistance with ADLS and IADLS  Disposition: return to [address] [place] [address]  Follow up appointments: Facility to arrange  Transportation at discharge: Hospital to Home- patient is ambulatory but has dementia   Caregiver Contact: Legal Guardian Deja Greenberg [phone]  Discharge Caregiver contacted prior to discharge? yes   Barriers to discharge: Medical stroke work up    [address] [facility] [place] spoke with the legal guardian and Terrance Rodriguez from [facility]. The [place] resides in [place]. He is ambulatory with out DME and needs assistance with ADLS and IADLS. The plan is to return to the [place] when medically stable. The program assesses the family and/or caregiver's readiness, willingness, and ability to provide or support and self-management activities for the patient as needed.       Beti Beltran, BSW

## 2024-01-10 ENCOUNTER — ANCILLARY PROCEDURE (OUTPATIENT)
Dept: MAMMOGRAPHY | Facility: CLINIC | Age: 69
End: 2024-01-10
Attending: STUDENT IN AN ORGANIZED HEALTH CARE EDUCATION/TRAINING PROGRAM
Payer: COMMERCIAL

## 2024-01-10 DIAGNOSIS — Z12.31 VISIT FOR SCREENING MAMMOGRAM: ICD-10-CM

## 2024-01-10 PROCEDURE — 77063 BREAST TOMOSYNTHESIS BI: CPT

## 2024-04-16 ENCOUNTER — LAB REQUISITION (OUTPATIENT)
Dept: LAB | Facility: CLINIC | Age: 69
End: 2024-04-16

## 2024-04-16 DIAGNOSIS — E11.9 TYPE 2 DIABETES MELLITUS WITHOUT COMPLICATIONS (H): ICD-10-CM

## 2024-04-16 PROCEDURE — 80053 COMPREHEN METABOLIC PANEL: CPT | Performed by: STUDENT IN AN ORGANIZED HEALTH CARE EDUCATION/TRAINING PROGRAM

## 2024-04-16 PROCEDURE — 80061 LIPID PANEL: CPT | Performed by: STUDENT IN AN ORGANIZED HEALTH CARE EDUCATION/TRAINING PROGRAM

## 2024-04-16 PROCEDURE — 82570 ASSAY OF URINE CREATININE: CPT | Performed by: STUDENT IN AN ORGANIZED HEALTH CARE EDUCATION/TRAINING PROGRAM

## 2024-04-17 LAB
ALBUMIN SERPL BCG-MCNC: 3.9 G/DL (ref 3.5–5.2)
ALP SERPL-CCNC: 84 U/L (ref 40–150)
ALT SERPL W P-5'-P-CCNC: 18 U/L (ref 0–50)
ANION GAP SERPL CALCULATED.3IONS-SCNC: 14 MMOL/L (ref 7–15)
AST SERPL W P-5'-P-CCNC: 26 U/L (ref 0–45)
BILIRUB SERPL-MCNC: 1.1 MG/DL
BUN SERPL-MCNC: 9 MG/DL (ref 8–23)
CALCIUM SERPL-MCNC: 9.6 MG/DL (ref 8.8–10.2)
CHLORIDE SERPL-SCNC: 102 MMOL/L (ref 98–107)
CHOLEST SERPL-MCNC: 109 MG/DL
CREAT SERPL-MCNC: 0.79 MG/DL (ref 0.51–0.95)
CREAT UR-MCNC: 22 MG/DL
DEPRECATED HCO3 PLAS-SCNC: 24 MMOL/L (ref 22–29)
EGFRCR SERPLBLD CKD-EPI 2021: 81 ML/MIN/1.73M2
FASTING STATUS PATIENT QL REPORTED: ABNORMAL
GLUCOSE SERPL-MCNC: 157 MG/DL (ref 70–99)
HDLC SERPL-MCNC: 45 MG/DL
LDLC SERPL CALC-MCNC: 25 MG/DL
MICROALBUMIN UR-MCNC: <12 MG/L
MICROALBUMIN/CREAT UR: NORMAL MG/G{CREAT}
NONHDLC SERPL-MCNC: 64 MG/DL
POTASSIUM SERPL-SCNC: 3.4 MMOL/L (ref 3.4–5.3)
PROT SERPL-MCNC: 7 G/DL (ref 6.4–8.3)
SODIUM SERPL-SCNC: 140 MMOL/L (ref 135–145)
TRIGL SERPL-MCNC: 194 MG/DL

## 2024-05-29 ENCOUNTER — HOSPITAL ENCOUNTER (OUTPATIENT)
Facility: CLINIC | Age: 69
End: 2024-05-29
Attending: INTERNAL MEDICINE | Admitting: INTERNAL MEDICINE
Payer: COMMERCIAL

## 2024-07-02 RX ORDER — DIGOXIN 125 MCG
125 TABLET ORAL DAILY
COMMUNITY

## 2024-07-07 ENCOUNTER — ANESTHESIA EVENT (OUTPATIENT)
Dept: SURGERY | Facility: CLINIC | Age: 69
End: 2024-07-07
Payer: COMMERCIAL

## 2024-07-08 ENCOUNTER — HOSPITAL ENCOUNTER (OUTPATIENT)
Facility: CLINIC | Age: 69
Discharge: HOME | End: 2024-07-08
Attending: INTERNAL MEDICINE | Admitting: INTERNAL MEDICINE
Payer: COMMERCIAL

## 2024-07-08 ENCOUNTER — TRANSFERRED RECORDS (OUTPATIENT)
Dept: SURGERY | Facility: CLINIC | Age: 69
End: 2024-07-08
Payer: COMMERCIAL

## 2024-07-08 ENCOUNTER — MEDICAL CORRESPONDENCE (OUTPATIENT)
Dept: SURGERY | Facility: CLINIC | Age: 69
End: 2024-07-08
Payer: COMMERCIAL

## 2024-07-08 ENCOUNTER — ANESTHESIA (OUTPATIENT)
Dept: SURGERY | Facility: CLINIC | Age: 69
End: 2024-07-08
Payer: COMMERCIAL

## 2024-07-08 VITALS
RESPIRATION RATE: 16 BRPM | TEMPERATURE: 97.8 F | SYSTOLIC BLOOD PRESSURE: 95 MMHG | WEIGHT: 190 LBS | DIASTOLIC BLOOD PRESSURE: 57 MMHG | HEART RATE: 69 BPM | BODY MASS INDEX: 34.96 KG/M2 | HEIGHT: 62 IN | OXYGEN SATURATION: 96 %

## 2024-07-08 LAB
COLONOSCOPY: NORMAL
GLUCOSE BLDC GLUCOMTR-MCNC: 189 MG/DL (ref 70–99)

## 2024-07-08 PROCEDURE — 258N000003 HC RX IP 258 OP 636: Performed by: NURSE ANESTHETIST, CERTIFIED REGISTERED

## 2024-07-08 PROCEDURE — 250N000009 HC RX 250: Performed by: NURSE ANESTHETIST, CERTIFIED REGISTERED

## 2024-07-08 PROCEDURE — 250N000011 HC RX IP 250 OP 636: Performed by: NURSE ANESTHETIST, CERTIFIED REGISTERED

## 2024-07-08 PROCEDURE — 88305 TISSUE EXAM BY PATHOLOGIST: CPT | Mod: TC | Performed by: INTERNAL MEDICINE

## 2024-07-08 PROCEDURE — 360N000075 HC SURGERY LEVEL 2, PER MIN: Performed by: INTERNAL MEDICINE

## 2024-07-08 PROCEDURE — 272N000001 HC OR GENERAL SUPPLY STERILE: Performed by: INTERNAL MEDICINE

## 2024-07-08 PROCEDURE — 82962 GLUCOSE BLOOD TEST: CPT

## 2024-07-08 PROCEDURE — 710N000012 HC RECOVERY PHASE 2, PER MINUTE: Performed by: INTERNAL MEDICINE

## 2024-07-08 PROCEDURE — 999N000141 HC STATISTIC PRE-PROCEDURE NURSING ASSESSMENT: Performed by: INTERNAL MEDICINE

## 2024-07-08 PROCEDURE — 258N000003 HC RX IP 258 OP 636: Performed by: STUDENT IN AN ORGANIZED HEALTH CARE EDUCATION/TRAINING PROGRAM

## 2024-07-08 PROCEDURE — 370N000017 HC ANESTHESIA TECHNICAL FEE, PER MIN: Performed by: INTERNAL MEDICINE

## 2024-07-08 PROCEDURE — 250N000011 HC RX IP 250 OP 636: Performed by: ANESTHESIOLOGY

## 2024-07-08 RX ORDER — OXYCODONE HYDROCHLORIDE 5 MG/1
5 TABLET ORAL
Status: DISCONTINUED | OUTPATIENT
Start: 2024-07-08 | End: 2024-07-08 | Stop reason: HOSPADM

## 2024-07-08 RX ORDER — NALOXONE HYDROCHLORIDE 0.4 MG/ML
0.4 INJECTION, SOLUTION INTRAMUSCULAR; INTRAVENOUS; SUBCUTANEOUS
Status: DISCONTINUED | OUTPATIENT
Start: 2024-07-08 | End: 2024-07-08 | Stop reason: HOSPADM

## 2024-07-08 RX ORDER — ONDANSETRON 2 MG/ML
4 INJECTION INTRAMUSCULAR; INTRAVENOUS
Status: DISCONTINUED | OUTPATIENT
Start: 2024-07-08 | End: 2024-07-08 | Stop reason: HOSPADM

## 2024-07-08 RX ORDER — ONDANSETRON 2 MG/ML
4 INJECTION INTRAMUSCULAR; INTRAVENOUS EVERY 30 MIN PRN
Status: DISCONTINUED | OUTPATIENT
Start: 2024-07-08 | End: 2024-07-08 | Stop reason: HOSPADM

## 2024-07-08 RX ORDER — ONDANSETRON 2 MG/ML
INJECTION INTRAMUSCULAR; INTRAVENOUS PRN
Status: DISCONTINUED | OUTPATIENT
Start: 2024-07-08 | End: 2024-07-08

## 2024-07-08 RX ORDER — SODIUM CHLORIDE, SODIUM LACTATE, POTASSIUM CHLORIDE, CALCIUM CHLORIDE 600; 310; 30; 20 MG/100ML; MG/100ML; MG/100ML; MG/100ML
INJECTION, SOLUTION INTRAVENOUS CONTINUOUS
Status: DISCONTINUED | OUTPATIENT
Start: 2024-07-08 | End: 2024-07-08 | Stop reason: HOSPADM

## 2024-07-08 RX ORDER — OXYCODONE HYDROCHLORIDE 5 MG/1
10 TABLET ORAL
Status: DISCONTINUED | OUTPATIENT
Start: 2024-07-08 | End: 2024-07-08 | Stop reason: HOSPADM

## 2024-07-08 RX ORDER — LIDOCAINE 40 MG/G
CREAM TOPICAL
Status: DISCONTINUED | OUTPATIENT
Start: 2024-07-08 | End: 2024-07-08 | Stop reason: HOSPADM

## 2024-07-08 RX ORDER — NALOXONE HYDROCHLORIDE 0.4 MG/ML
0.2 INJECTION, SOLUTION INTRAMUSCULAR; INTRAVENOUS; SUBCUTANEOUS
Status: DISCONTINUED | OUTPATIENT
Start: 2024-07-08 | End: 2024-07-08 | Stop reason: HOSPADM

## 2024-07-08 RX ORDER — ONDANSETRON 4 MG/1
4 TABLET, ORALLY DISINTEGRATING ORAL EVERY 30 MIN PRN
Status: DISCONTINUED | OUTPATIENT
Start: 2024-07-08 | End: 2024-07-08 | Stop reason: HOSPADM

## 2024-07-08 RX ORDER — PROCHLORPERAZINE MALEATE 5 MG
5 TABLET ORAL EVERY 6 HOURS PRN
Status: DISCONTINUED | OUTPATIENT
Start: 2024-07-08 | End: 2024-07-08 | Stop reason: HOSPADM

## 2024-07-08 RX ORDER — PROPOFOL 10 MG/ML
INJECTION, EMULSION INTRAVENOUS CONTINUOUS PRN
Status: DISCONTINUED | OUTPATIENT
Start: 2024-07-08 | End: 2024-07-08

## 2024-07-08 RX ORDER — NALOXONE HYDROCHLORIDE 0.4 MG/ML
0.1 INJECTION, SOLUTION INTRAMUSCULAR; INTRAVENOUS; SUBCUTANEOUS
Status: DISCONTINUED | OUTPATIENT
Start: 2024-07-08 | End: 2024-07-08 | Stop reason: HOSPADM

## 2024-07-08 RX ORDER — ONDANSETRON 2 MG/ML
4 INJECTION INTRAMUSCULAR; INTRAVENOUS EVERY 6 HOURS PRN
Status: DISCONTINUED | OUTPATIENT
Start: 2024-07-08 | End: 2024-07-08 | Stop reason: HOSPADM

## 2024-07-08 RX ORDER — LIDOCAINE HYDROCHLORIDE 10 MG/ML
INJECTION, SOLUTION INFILTRATION; PERINEURAL PRN
Status: DISCONTINUED | OUTPATIENT
Start: 2024-07-08 | End: 2024-07-08

## 2024-07-08 RX ORDER — DEXAMETHASONE SODIUM PHOSPHATE 10 MG/ML
4 INJECTION, SOLUTION INTRAMUSCULAR; INTRAVENOUS
Status: DISCONTINUED | OUTPATIENT
Start: 2024-07-08 | End: 2024-07-08 | Stop reason: HOSPADM

## 2024-07-08 RX ORDER — FLUMAZENIL 0.1 MG/ML
0.2 INJECTION, SOLUTION INTRAVENOUS
Status: DISCONTINUED | OUTPATIENT
Start: 2024-07-08 | End: 2024-07-08 | Stop reason: HOSPADM

## 2024-07-08 RX ORDER — PROPOFOL 10 MG/ML
INJECTION, EMULSION INTRAVENOUS PRN
Status: DISCONTINUED | OUTPATIENT
Start: 2024-07-08 | End: 2024-07-08

## 2024-07-08 RX ORDER — ONDANSETRON 4 MG/1
4 TABLET, ORALLY DISINTEGRATING ORAL EVERY 6 HOURS PRN
Status: DISCONTINUED | OUTPATIENT
Start: 2024-07-08 | End: 2024-07-08 | Stop reason: HOSPADM

## 2024-07-08 RX ADMIN — ONDANSETRON 4 MG: 2 INJECTION INTRAMUSCULAR; INTRAVENOUS at 09:55

## 2024-07-08 RX ADMIN — LIDOCAINE HYDROCHLORIDE 5 ML: 10 INJECTION, SOLUTION INFILTRATION; PERINEURAL at 09:54

## 2024-07-08 RX ADMIN — ONDANSETRON 4 MG: 2 INJECTION INTRAMUSCULAR; INTRAVENOUS at 10:35

## 2024-07-08 RX ADMIN — PHENYLEPHRINE HYDROCHLORIDE 100 MCG: 10 INJECTION INTRAVENOUS at 10:04

## 2024-07-08 RX ADMIN — SODIUM CHLORIDE, POTASSIUM CHLORIDE, SODIUM LACTATE AND CALCIUM CHLORIDE: 600; 310; 30; 20 INJECTION, SOLUTION INTRAVENOUS at 08:32

## 2024-07-08 RX ADMIN — PROPOFOL 30 MG: 10 INJECTION, EMULSION INTRAVENOUS at 09:54

## 2024-07-08 RX ADMIN — PHENYLEPHRINE HYDROCHLORIDE 100 MCG: 10 INJECTION INTRAVENOUS at 10:00

## 2024-07-08 RX ADMIN — PROPOFOL 150 MCG/KG/MIN: 10 INJECTION, EMULSION INTRAVENOUS at 09:54

## 2024-07-08 RX ADMIN — PHENYLEPHRINE HYDROCHLORIDE 150 MCG: 10 INJECTION INTRAVENOUS at 10:08

## 2024-07-08 RX ADMIN — PROPOFOL 20 MG: 10 INJECTION, EMULSION INTRAVENOUS at 09:57

## 2024-07-08 ASSESSMENT — ACTIVITIES OF DAILY LIVING (ADL)
ADLS_ACUITY_SCORE: 35

## 2024-07-08 ASSESSMENT — COPD QUESTIONNAIRES: COPD: 1

## 2024-07-08 NOTE — ANESTHESIA CARE TRANSFER NOTE
Patient: Janna Quispe    Procedure: Procedure(s):  COLONOSCOPY with polypectomies       Diagnosis: Positive colorectal cancer screening using Cologuard test [R19.5]  Diagnosis Additional Information: No value filed.    Anesthesia Type:   MAC     Note:    Oropharynx: oropharynx clear of all foreign objects  Level of Consciousness: drowsy  Oxygen Supplementation: face mask  Level of Supplemental Oxygen (L/min / FiO2): 8  Independent Airway: airway patency satisfactory and stable    Vital Signs Stable: post-procedure vital signs reviewed and stable  Report to RN Given: handoff report given  Patient transferred to: Phase II    Handoff Report: Identifed the Patient, Identified the Reponsible Provider, Reviewed the pertinent medical history, Discussed the surgical course, Reviewed Intra-OP anesthesia mangement and issues during anesthesia, Set expectations for post-procedure period and Allowed opportunity for questions and acknowledgement of understanding      Vitals:  Vitals Value Taken Time   BP 94/45    Temp 98.7    Pulse 71 07/08/24 1019   Resp 16    SpO2 99 % 07/08/24 1019   Vitals shown include unfiled device data.    Electronically Signed By: ZACH Morrow CRNA  July 8, 2024  10:20 AM

## 2024-07-08 NOTE — ANESTHESIA PREPROCEDURE EVALUATION
Anesthesia Pre-Procedure Evaluation    Patient: Janna Quispe   MRN: [id number] : 1955        Procedure : Procedure(s):  COLONOSCOPY          Past Medical History:   Diagnosis Date    Breast cancer (H)     CAD (coronary artery disease)     Congestive heart failure (H)     COPD (chronic obstructive pulmonary disease) (H)     Diabetes (H)     HLD (hyperlipidemia)     Hx antineoplastic chemotherapy     Hx of radiation therapy     Hypertension     IVCD (intraventricular conduction defect)     LBBB (left bundle branch block)     VT (ventricular tachycardia) (H)       Past Surgical History:   Procedure Laterality Date    BIOPSY BREAST Left     CORONARY ANGIOGRAPHY ADULT ORDER  2018    at [facility] CORONARY ANGIOGRAM N/A 2021    Procedure: Coronary Angiogram;  Surgeon: Romelia Darby MD;  Location: [facility] CATH LAB CV    CV LEFT HEART CATH N/A 2021    Procedure: Left Heart Cath;  Surgeon: Romelia Darby MD;  Location: [facility] CATH LAB CV    IMPLANT AUTOMATIC IMPLANTABLE CARDIOVERTER DEFIBRILLATOR      LUMPECTOMY BREAST Left     SHOULDER SURGERY        Allergies   Allergen Reactions    Codeine Nausea and Vomiting    Pravastatin Muscle Pain (Myalgia)    Varenicline      Other reaction(s): Nightmares    Penicillins Rash      Social History     Tobacco Use    Smoking status: Every Day     Current packs/day: 0.25     Average packs/day: 0.3 packs/day for 44.0 years (11.0 ttl pk-yrs)     Types: Cigarettes    Smokeless tobacco: Never    Tobacco comments:     Seen by TTS on 21   Substance Use Topics    Alcohol use: Not Currently      Wt Readings from Last 1 Encounters:   24 86.2 kg (190 lb)        Anesthesia Evaluation   Pt has had prior anesthetic.     No history of anesthetic complications       ROS/MED HX  ENT/Pulmonary:     (+)                          COPD,              Neurologic:  - neg neurologic ROS     Cardiovascular:     (+) Dyslipidemia hypertension- -  CAD -  - -       "CHF        pacemaker,                        METS/Exercise Tolerance:     Hematologic:  - neg hematologic  ROS     Musculoskeletal:       GI/Hepatic:  - neg GI/hepatic ROS     Renal/Genitourinary:  - neg Renal ROS     Endo:     (+)  type II DM,                    Psychiatric/Substance Use:       Infectious Disease:       Malignancy:       Other:            Physical Exam    Airway        Mallampati: II   TM distance: > 3 FB   Neck ROM: full   Mouth opening: > 3 cm    Respiratory Devices and Support         Dental       (+) Removable bridges or other hardware      Cardiovascular   cardiovascular exam normal          Pulmonary   pulmonary exam normal                OUTSIDE LABS:  CBC:   Lab Results   Component Value Date    WBC 7.9 09/14/2021    WBC 7.8 09/13/2021    HGB 12.5 09/14/2021    HGB 13.3 09/13/2021    HCT 37.8 09/14/2021    HCT 38.9 09/13/2021     09/14/2021     09/13/2021     BMP:   Lab Results   Component Value Date     04/16/2024     03/06/2023    POTASSIUM 3.4 04/16/2024    POTASSIUM 3.7 03/06/2023    CHLORIDE 102 04/16/2024    CHLORIDE 101 03/06/2023    CO2 24 04/16/2024    CO2 24 03/06/2023    BUN 9.0 04/16/2024    BUN 12.3 03/06/2023    CR 0.79 04/16/2024    CR 0.72 03/06/2023     (H) 07/08/2024     (H) 04/16/2024     COAGS: No results found for: \"PTT\", \"INR\", \"FIBR\"  POC: No results found for: \"BGM\", \"HCG\", \"HCGS\"  HEPATIC:   Lab Results   Component Value Date    ALBUMIN 3.9 04/16/2024    PROTTOTAL 7.0 04/16/2024    ALT 18 04/16/2024    AST 26 04/16/2024    ALKPHOS 84 04/16/2024    BILITOTAL 1.1 04/16/2024     OTHER:   Lab Results   Component Value Date    A1C 6.3 (H) 09/12/2021    HECTOR 9.6 04/16/2024    MAG 1.9 09/14/2021       Anesthesia Plan    ASA Status:  3    NPO Status:  NPO Appropriate    Anesthesia Type: MAC.              Consents    Anesthesia Plan(s) and associated risks, benefits, and realistic alternatives discussed. Questions answered and " "patient/representative(s) expressed understanding.     - Discussed: Risks, Benefits and Alternatives for the PROCEDURE were discussed     - Discussed with:  Spouse, Patient            Postoperative Care       PONV prophylaxis: Ondansetron (or other 5HT-3)     Comments:               Jj Pond MD    I have reviewed the pertinent notes and labs in the chart from the past 30 days and (re)examined the patient.  Any updates or changes from those notes are reflected in this note.            # Drug Induced Coagulation Defect: home medication list includes an anticoagulant medication   # Obesity: Estimated body mass index is 34.75 kg/m  as calculated from the following:    Height as of this encounter: 1.575 m (5' 2\").    Weight as of this encounter: 86.2 kg (190 lb).      "

## 2024-07-08 NOTE — H&P
"  GASTROENTEROLOGY PRE-PROCEDURAL HISTORY AND PHYSICAL     INDICATION FOR PROCEDURE   Colon cancer screening       HISTORY    Reviewed and no change.     PHYSICAL EXAMINATION     Vitals Blood pressure 114/70, pulse 88, temperature 97.6  F (36.4  C), temperature source Temporal, resp. rate 14, height 1.575 m (5' 2\"), weight 86.2 kg (190 lb), SpO2 97%.          Physical Exam  General: awake, alert, oriented times three   Cardiovascular: RRR, no edema   Airway: Normal   Chest: lungs are clear to auscultation bilaterally   Abdomen: soft, non-tender        PREVIOUS REACTION TO SEDATION/ANESTHESIA    None     SEDATION PLAN BASED ON ASSESSMENT   Per Anesthesia     ASA CLASSIFICATION   ASA Classification: 3     MALLAMPATI SCORE     Class II      IMPRESSION   Patient deemed adequate candidate for anesthesia.     PLANNED PROCEDURE   Colonoscopy     Eber Landers MD [facility]  I appreciate the opportunity to participate in the care of this patient.   Please feel free to call me with any questions or concerns.     "

## 2024-07-08 NOTE — ANESTHESIA POSTPROCEDURE EVALUATION
Patient: Janna Quispe    Procedure: Procedure(s):  COLONOSCOPY with polypectomies       Anesthesia Type:  MAC    Note:     Postop Pain Control: Uneventful            Sign Out: Well controlled pain   PONV: No   Neuro/Psych: Uneventful            Sign Out: Acceptable/Baseline neuro status   Airway/Respiratory: Uneventful            Sign Out: Acceptable/Baseline resp. status   CV/Hemodynamics: Uneventful            Sign Out: Acceptable CV status; No obvious hypovolemia; No obvious fluid overload   Other NRE: NONE   DID A NON-ROUTINE EVENT OCCUR? No           Last vitals:  Vitals Value Taken Time   BP 95/57 07/08/24 1053   Temp 36.6  C (97.8  F) 07/08/24 1018   Pulse 69 07/08/24 1056   Resp 16 07/08/24 1018   SpO2 96 % 07/08/24 1056   Vitals shown include unfiled device data.    Electronically Signed By: Jj Pond MD

## 2024-07-09 LAB
PATH REPORT.COMMENTS IMP SPEC: NORMAL
PATH REPORT.COMMENTS IMP SPEC: NORMAL
PATH REPORT.FINAL DX SPEC: NORMAL
PATH REPORT.GROSS SPEC: NORMAL
PATH REPORT.MICROSCOPIC SPEC OTHER STN: NORMAL
PATH REPORT.RELEVANT HX SPEC: NORMAL
PHOTO IMAGE: NORMAL

## 2024-07-09 PROCEDURE — 88305 TISSUE EXAM BY PATHOLOGIST: CPT | Mod: 26 | Performed by: PATHOLOGY

## 2025-01-13 ENCOUNTER — ANCILLARY PROCEDURE (OUTPATIENT)
Dept: MAMMOGRAPHY | Facility: CLINIC | Age: 70
End: 2025-01-13
Attending: STUDENT IN AN ORGANIZED HEALTH CARE EDUCATION/TRAINING PROGRAM
Payer: COMMERCIAL

## 2025-01-13 DIAGNOSIS — Z12.31 VISIT FOR SCREENING MAMMOGRAM: ICD-10-CM

## 2025-01-13 PROCEDURE — 77063 BREAST TOMOSYNTHESIS BI: CPT

## 2025-05-15 ENCOUNTER — TELEPHONE (OUTPATIENT)
Dept: PHARMACY | Facility: OTHER | Age: 70
End: 2025-05-15
Payer: COMMERCIAL

## 2025-05-15 NOTE — TELEPHONE ENCOUNTER
Rostia patient    [facility] Recruitment: [facility]  insurance     Referral outreach attempt #1 on May 15, 2025      Outcome: left voicemail- Call back number [phone]    See Pernell PEREIRA   [phone]

## 2025-06-10 ENCOUNTER — TELEPHONE (OUTPATIENT)
Dept: PHARMACY | Facility: OTHER | Age: 70
End: 2025-06-10
Payer: COMMERCIAL

## 2025-06-10 NOTE — TELEPHONE ENCOUNTER
Rosita patient    [facility] Recruitment: [facility]  insurance     Referral outreach attempt #2 on Christina 10, 2025      Outcome: left voicemail- Call back number [phone]    See Pernell  [facility]   [phone]

## 2025-06-19 ENCOUNTER — TELEPHONE (OUTPATIENT)
Dept: PHARMACY | Facility: OTHER | Age: 70
End: 2025-06-19
Payer: COMMERCIAL

## 2025-06-19 NOTE — TELEPHONE ENCOUNTER
Rosita patient    [facility] Recruitment: [facility]  insurance     Referral outreach attempt #3 on June 19, 2025      Outcome: left voicemail- Call back number [phone]    See Pernell  [facility]   [phone]

## 2025-07-10 ENCOUNTER — LAB REQUISITION (OUTPATIENT)
Dept: LAB | Facility: CLINIC | Age: 70
End: 2025-07-10
Payer: COMMERCIAL

## 2025-07-10 DIAGNOSIS — E11.65 TYPE 2 DIABETES MELLITUS WITH HYPERGLYCEMIA (H): ICD-10-CM

## 2025-07-10 DIAGNOSIS — E78.5 HYPERLIPIDEMIA, UNSPECIFIED: ICD-10-CM

## 2025-07-10 LAB
ALBUMIN SERPL BCG-MCNC: 3.8 G/DL (ref 3.5–5.2)
ALP SERPL-CCNC: 78 U/L (ref 40–150)
ALT SERPL W P-5'-P-CCNC: 17 U/L (ref 0–50)
ANION GAP SERPL CALCULATED.3IONS-SCNC: 13 MMOL/L (ref 7–15)
AST SERPL W P-5'-P-CCNC: 21 U/L (ref 0–45)
BILIRUB SERPL-MCNC: 0.9 MG/DL
BUN SERPL-MCNC: 13.1 MG/DL (ref 8–23)
CALCIUM SERPL-MCNC: 9.4 MG/DL (ref 8.8–10.4)
CHLORIDE SERPL-SCNC: 102 MMOL/L (ref 98–107)
CHOLEST SERPL-MCNC: 104 MG/DL
CREAT SERPL-MCNC: 1.09 MG/DL (ref 0.51–0.95)
CREAT UR-MCNC: 154 MG/DL
EGFRCR SERPLBLD CKD-EPI 2021: 54 ML/MIN/1.73M2
FASTING STATUS PATIENT QL REPORTED: ABNORMAL
FASTING STATUS PATIENT QL REPORTED: ABNORMAL
GLUCOSE SERPL-MCNC: 186 MG/DL (ref 70–99)
HCO3 SERPL-SCNC: 24 MMOL/L (ref 22–29)
HDLC SERPL-MCNC: 38 MG/DL
LDLC SERPL CALC-MCNC: 27 MG/DL
MICROALBUMIN UR-MCNC: 22.9 MG/L
MICROALBUMIN/CREAT UR: 14.87 MG/G CR (ref 0–25)
NONHDLC SERPL-MCNC: 66 MG/DL
POTASSIUM SERPL-SCNC: 3.8 MMOL/L (ref 3.4–5.3)
PROT SERPL-MCNC: 7 G/DL (ref 6.4–8.3)
SODIUM SERPL-SCNC: 139 MMOL/L (ref 135–145)
TRIGL SERPL-MCNC: 195 MG/DL

## 2025-07-10 PROCEDURE — 82570 ASSAY OF URINE CREATININE: CPT | Mod: ORL | Performed by: FAMILY MEDICINE

## 2025-07-10 PROCEDURE — 80061 LIPID PANEL: CPT | Mod: ORL | Performed by: FAMILY MEDICINE

## 2025-07-10 PROCEDURE — 80053 COMPREHEN METABOLIC PANEL: CPT | Mod: ORL | Performed by: FAMILY MEDICINE

## (undated) DEVICE — CATH ANGIO INFINITI JL3.5 4FRX100CM 538418

## (undated) DEVICE — SLEEVE TR BAND RADIAL COMPRESSION DEVICE 24CM TRB24-REG

## (undated) DEVICE — SHEATH GLIDE RADIAL 4FR 25CM 0.021

## (undated) DEVICE — INTRO MICRO MINI STICK 4FR STD NITINOL

## (undated) DEVICE — EXCHANGE WIRE .035 260 STAR/JFC/035/260/ M001491681

## (undated) DEVICE — CATH DIAG 4FR JR 5.0 538423

## (undated) DEVICE — GUIDEWIRE VASCULAR 0.035IN DIA 145CML 15CML/6CML STAINLESS

## (undated) DEVICE — ENDO SNARE EXACTO COLD 9MM LOOP 2.4MMX230CM 00711115

## (undated) DEVICE — CATH DIAG 4FR ANG PIG 538453S

## (undated) RX ORDER — ASPIRIN 81 MG/1
TABLET, CHEWABLE ORAL
Status: DISPENSED
Start: 2021-09-13

## (undated) RX ORDER — PROPOFOL 10 MG/ML
INJECTION, EMULSION INTRAVENOUS
Status: DISPENSED
Start: 2024-07-08

## (undated) RX ORDER — FENTANYL CITRATE-0.9 % NACL/PF 10 MCG/ML
PLASTIC BAG, INJECTION (ML) INTRAVENOUS
Status: DISPENSED
Start: 2024-07-08

## (undated) RX ORDER — DIAZEPAM 5 MG
TABLET ORAL
Status: DISPENSED
Start: 2021-09-13